# Patient Record
Sex: FEMALE | Race: ASIAN | Employment: UNEMPLOYED | URBAN - METROPOLITAN AREA
[De-identification: names, ages, dates, MRNs, and addresses within clinical notes are randomized per-mention and may not be internally consistent; named-entity substitution may affect disease eponyms.]

---

## 2017-03-20 ENCOUNTER — OFFICE VISIT (OUTPATIENT)
Dept: FAMILY MEDICINE CLINIC | Age: 82
End: 2017-03-20

## 2017-03-20 VITALS
SYSTOLIC BLOOD PRESSURE: 155 MMHG | TEMPERATURE: 98 F | DIASTOLIC BLOOD PRESSURE: 87 MMHG | OXYGEN SATURATION: 96 % | BODY MASS INDEX: 28.12 KG/M2 | RESPIRATION RATE: 16 BRPM | WEIGHT: 125 LBS | HEIGHT: 56 IN | HEART RATE: 79 BPM

## 2017-03-20 DIAGNOSIS — H91.90 HEARING LOSS, UNSPECIFIED LATERALITY: ICD-10-CM

## 2017-03-20 DIAGNOSIS — E78.5 HYPERLIPIDEMIA, UNSPECIFIED HYPERLIPIDEMIA TYPE: Primary | ICD-10-CM

## 2017-03-20 DIAGNOSIS — E55.9 VITAMIN D DEFICIENCY: ICD-10-CM

## 2017-03-20 DIAGNOSIS — J20.9 ACUTE BRONCHITIS, UNSPECIFIED: ICD-10-CM

## 2017-03-20 DIAGNOSIS — M81.0 OSTEOPOROSIS: ICD-10-CM

## 2017-03-20 DIAGNOSIS — E53.8 B12 DEFICIENCY: ICD-10-CM

## 2017-03-20 RX ORDER — BENZONATATE 100 MG/1
100 CAPSULE ORAL
Qty: 30 CAP | Refills: 0 | Status: SHIPPED | OUTPATIENT
Start: 2017-03-20 | End: 2017-09-20

## 2017-03-20 RX ORDER — DOXYCYCLINE 100 MG/1
100 CAPSULE ORAL 2 TIMES DAILY
Qty: 14 CAP | Refills: 0 | Status: SHIPPED | OUTPATIENT
Start: 2017-03-20 | End: 2017-03-27

## 2017-03-20 NOTE — PROGRESS NOTES
Juan Bruce is here today to follow up for chronic conditions. 1. Have you been to the ER, urgent care clinic since your last visit? Hospitalized since your last visit? No    2. Have you seen or consulted any other health care providers outside of the [facility] since your last visit? Include any pap smears or colon screening.  No

## 2017-03-20 NOTE — PROGRESS NOTES
Chief Complaint   Patient presents with    Hypertension     Follow up    Cholesterol Problem    Anemia       Pt is a 80y.o. year old female who presents for follow up of her chronic medical problems  Last seen here in 2011    BP Readings from Last 3 Encounters:   03/20/17 155/87   12/02/11 144/68   11/03/11 128/59   Repeat BP still slightly elevated    Fasting today  Stopped her statins a while back but does not recall any side effects    Cough for the last 5 days  No OTC meds  No fever, wheezing  Getting ready to travel to [place] to visit her sister who is in hospice    Not on any meds except for OTC meds    Drinks beer every so often  Non smoker since 2009      Has hearing loss per relative who is with her today    ROS:    Pt denies: Wt loss, Fever/Chills, HA, Visual changes, Fatigue, Chest pain, SOB, WORKMAN, Abd pain, N/V/D/C, Blood in stool or urine, Edema. Pertinent positive as above in HPI. All others were negative    Patient Active Problem List   Diagnosis Code    Spina bifida 26    Hyperlipidemia E78.5    Anemia D64.9    Osteoporosis M81.0    B12 deficiency E53.8       Past Medical History:   Diagnosis Date    Arthritis     Chronic pain     GERD (gastroesophageal reflux disease)        Current Outpatient Prescriptions   Medication Sig Dispense Refill    calcium 500 mg Tab Take  by mouth. History   Smoking Status    Passive Smoke Exposure - Never Smoker    Packs/day: 0.00    Types: Cigarettes    Last attempt to quit: 11/3/2010   Smokeless Tobacco    Never Used       No Known Allergies    Patient Labs were reviewed: yes      Patient Past Records were reviewed:  yes        Objective:     Vitals:    03/20/17 1327   BP: 155/87   Pulse: 79   Resp: 16   Temp: 98 °F (36.7 °C)   TempSrc: Oral   SpO2: 96%   Weight: 125 lb (56.7 kg)   Height: 4' 8\" (1.422 m)     Body mass index is 28.02 kg/(m^2).     Exam:   Appearance: alert, well appearing,  oriented to person, place, and time, acyanotic, in no respiratory distress and well hydrated. Coughing  HEENT:  NC/AT, pink conj, anicteric sclerae, no cerumen impaction, nasal congestion, no sinus tenderness  Neck:  No cervical lymphadenopathy, no JVD, no thyromegaly, no carotid bruit  Heart:  RRR without M/R/G  Lungs:  Scattered rhonchi but no crackle,  rales, or wheezes with good air exchange   Abdomen:  Non-tender, pos bowel sounds, no hepatosplenomegaly  Ext:  No C/C/E    Skin: no rash  Neuro: no lateralizing signs, CNs II-XII intact  Back: no tenderness along the spine    Assessment/ Plan:   Sara Munson was seen today for hypertension, cholesterol problem and anemia. Diagnoses and all orders for this visit:    Hyperlipidemia, unspecified hyperlipidemia type-low cholesterol diet discussed; will restart statin based on lipid results  -     METABOLIC PANEL, COMPREHENSIVE; Future  -     LIPID PANEL; Future      B12 deficiency-take OTC B12 daily  -     VITAMIN B12; Future    Osteoporosis-advised weight bearing exercises  -     DEXA BONE DENSITY STUDY AXIAL; Future    Hearing loss, unspecified laterality  -     REFERRAL TO ENT-OTOLARYNGOLOGY for hearing test    Elevated BP-low sodium diet advised  -     CBC WITH AUTOMATED DIFF; Future  -     METABOLIC PANEL, COMPREHENSIVE; Future    Vitamin D deficiency-advised sun exposure, OTC Ca+d daily  -     VITAMIN D, 25 HYDROXY; Future      Acute bronchitis, unspecified  -     doxycycline (VIBRAMYCIN) 100 mg capsule; Take 1 Cap by mouth two (2) times a day for 7 days. -     benzonatate (TESSALON) 100 mg capsule; Take 1 Cap by mouth three (3) times daily as needed for Cough. Follow-up Disposition:  Return in about 3 months (around 6/20/2017) for follow up. I have discussed the diagnosis with the patient and the intended plan as seen in the above orders. The patient has received an After-Visit Summary and questions were answered concerning future plans.      Medication Side Effects and Warnings were discussed with patient: yes    Patient verbalized understanding of above instructions.     Jeanette Mart MD  Internal Medicine  [facility]

## 2017-03-20 NOTE — PATIENT INSTRUCTIONS
High Cholesterol: Care Instructions  Your Care Instructions  Cholesterol is a type of fat in your blood. It is needed for many body functions, such as making new cells. Cholesterol is made by your body. It also comes from food you eat. High cholesterol means that you have too much of the fat in your blood. This raises your risk of a heart attack and stroke. LDL and HDL are part of your total cholesterol. LDL is the \"bad\" cholesterol. High LDL can raise your risk for heart disease, heart attack, and stroke. HDL is the \"good\" cholesterol. It helps clear bad cholesterol from the body. High HDL is linked with a lower risk of heart disease, heart attack, and stroke. Your cholesterol levels help your doctor find out your risk for having a heart attack or stroke. You and your doctor can talk about whether you need to lower your risk and what treatment is best for you. A heart-healthy lifestyle along with medicines can help lower your cholesterol and your risk. The way you choose to lower your risk will depend on how high your risk is for heart attack and stroke. It will also depend on how you feel about taking medicines. Follow-up care is a key part of your treatment and safety. Be sure to make and go to all appointments, and call your doctor if you are having problems. It's also a good idea to know your test results and keep a list of the medicines you take. How can you care for yourself at home? · Eat a variety of foods every day. Good choices include fruits, vegetables, whole grains (like oatmeal), dried beans and peas, nuts and seeds, soy products (like tofu), and fat-free or low-fat dairy products. · Replace butter, margarine, and hydrogenated or partially hydrogenated oils with olive and canola oils. (Canola oil margarine without trans fat is fine.)  · Replace red meat with fish, poultry, and soy protein (like tofu). · Limit processed and packaged foods like chips, crackers, and cookies.   · Bake, broil, or steam foods. Don't arcos them. · Be physically active. Get at least 30 minutes of exercise on most days of the week. Walking is a good choice. You also may want to do other activities, such as running, swimming, cycling, or playing tennis or team sports. · Stay at a healthy weight or lose weight by making the changes in eating and physical activity listed above. Losing just a small amount of weight, even 5 to 10 pounds, can reduce your risk for having a heart attack or stroke. · Do not smoke. When should you call for help? Watch closely for changes in your health, and be sure to contact your doctor if:  · You need help making lifestyle changes. · You have questions about your medicine. Where can you learn more? Go to http://tiffani-emre.info/. Enter C481 in the search box to learn more about \"High Cholesterol: Care Instructions. \"  Current as of: January 27, 2016  Content Version: 11.1  © 9707-0472 [employer]. Care instructions adapted under license by [employer] (which disclaims liability or warranty for this information). If you have questions about a medical condition or this instruction, always ask your healthcare professional. [address] any warranty or liability for your use of this information. DASH Diet: Care Instructions  Your Care Instructions  The DASH diet is an eating plan that can help lower your blood pressure. DASH stands for Dietary Approaches to Stop Hypertension. Hypertension is high blood pressure. The DASH diet focuses on eating foods that are high in calcium, potassium, and magnesium. These nutrients can lower blood pressure. The foods that are highest in these nutrients are fruits, vegetables, low-fat dairy products, nuts, seeds, and legumes. But taking calcium, potassium, and magnesium supplements instead of eating foods that are high in those nutrients does not have the same effect.  The DASH diet also includes whole grains, fish, and poultry. The DASH diet is one of several lifestyle changes your doctor may recommend to lower your high blood pressure. Your doctor may also want you to decrease the amount of sodium in your diet. Lowering sodium while following the DASH diet can lower blood pressure even further than just the DASH diet alone. Follow-up care is a key part of your treatment and safety. Be sure to make and go to all appointments, and call your doctor if you are having problems. It's also a good idea to know your test results and keep a list of the medicines you take. How can you care for yourself at home? Following the DASH diet  · Eat 4 to 5 servings of fruit each day. A serving is 1 medium-sized piece of fruit, ½ cup chopped or canned fruit, 1/4 cup dried fruit, or 4 ounces (½ cup) of fruit juice. Choose fruit more often than fruit juice. · Eat 4 to 5 servings of vegetables each day. A serving is 1 cup of lettuce or raw leafy vegetables, ½ cup of chopped or cooked vegetables, or 4 ounces (½ cup) of vegetable juice. Choose vegetables more often than vegetable juice. · Get 2 to 3 servings of low-fat and fat-free dairy each day. A serving is 8 ounces of milk, 1 cup of yogurt, or 1 ½ ounces of cheese. · Eat 6 to 8 servings of grains each day. A serving is 1 slice of bread, 1 ounce of dry cereal, or ½ cup of cooked rice, pasta, or cooked cereal. Try to choose whole-grain products as much as possible. · Limit lean meat, poultry, and fish to 2 servings each day. A serving is 3 ounces, about the size of a deck of cards. · Eat 4 to 5 servings of nuts, seeds, and legumes (cooked dried beans, lentils, and split peas) each week. A serving is 1/3 cup of nuts, 2 tablespoons of seeds, or ½ cup of cooked beans or peas. · Limit fats and oils to 2 to 3 servings each day. A serving is 1 teaspoon of vegetable oil or 2 tablespoons of salad dressing. · Limit sweets and added sugars to 5 servings or less a week. A serving is 1 tablespoon jelly or jam, ½ cup sorbet, or 1 cup of lemonade. · Eat less than 2,300 milligrams (mg) of sodium a day. If you limit your sodium to 1,500 mg a day, you can lower your blood pressure even more. Tips for success  · Start small. Do not try to make dramatic changes to your diet all at once. You might feel that you are missing out on your favorite foods and then be more likely to not follow the plan. Make small changes, and stick with them. Once those changes become habit, add a few more changes. · Try some of the following:  ¨ Make it a goal to eat a fruit or vegetable at every meal and at snacks. This will make it easy to get the recommended amount of fruits and vegetables each day. ¨ Try yogurt topped with fruit and nuts for a snack or healthy dessert. ¨ Add lettuce, tomato, cucumber, and onion to sandwiches. ¨ Combine a ready-made pizza crust with low-fat mozzarella cheese and lots of vegetable toppings. Try using tomatoes, squash, spinach, broccoli, carrots, cauliflower, and onions. ¨ Have a variety of cut-up vegetables with a low-fat dip as an appetizer instead of chips and dip. ¨ Sprinkle sunflower seeds or chopped almonds over salads. Or try adding chopped walnuts or almonds to cooked vegetables. ¨ Try some vegetarian meals using beans and peas. Add garbanzo or kidney beans to salads. Make burritos and tacos with mashed perez beans or black beans. Where can you learn more? Go to http://tiffani-emre.info/. Enter T897 in the search box to learn more about \"DASH Diet: Care Instructions. \"  Current as of: March 23, 2016  Content Version: 11.1  © 0198-3309 [employer]. Care instructions adapted under license by [employer] (which disclaims liability or warranty for this information).  If you have questions about a medical condition or this instruction, always ask your healthcare professional. Digna Schumacher disclaims any warranty or liability for your use of this information.

## 2017-03-20 NOTE — MR AVS SNAPSHOT
Visit Information Date & Time Provider Department Dept. Phone Encounter #  
 3/20/2017  1:00 PM Johanna [facility] [id number] Follow-up Instructions Return in about 3 months (around 6/20/2017) for follow up. Upcoming Health Maintenance Date Due DTaP/Tdap/Td series (1 - Tdap) 8/16/1954 ZOSTER VACCINE AGE 60> 8/16/1993 GLAUCOMA SCREENING Q2Y 8/16/1998 MEDICARE YEARLY EXAM 8/16/1998 INFLUENZA AGE 9 TO ADULT 8/1/2016 Pneumococcal 65+ Low/Medium Risk (2 of 2 - PPSV23) 11/3/2016 Allergies as of 3/20/2017  Review Complete On: 3/20/2017 By: Johanna Francois MD  
 No Known Allergies Current Immunizations  Reviewed on 11/3/2011 Name Date Pneumococcal Vaccine (Unspecified Type) 11/3/2011 Not reviewed this visit You Were Diagnosed With   
  
 Codes Comments Hyperlipidemia, unspecified hyperlipidemia type    -  Primary ICD-10-CM: E78.5 ICD-9-CM: 272.4 B12 deficiency     ICD-10-CM: E53.8 ICD-9-CM: 266.2 Osteoporosis     ICD-10-CM: M81.0 ICD-9-CM: 733.00 Hearing loss, unspecified laterality     ICD-10-CM: H91.90 ICD-9-CM: 389.9 Elevated BP     ICD-10-CM: I10 
ICD-9-CM: 401.9 Vitamin D deficiency     ICD-10-CM: E55.9 ICD-9-CM: 268.9 Acute bronchitis, unspecified     ICD-10-CM: J20.9 ICD-9-CM: 466.0 Vitals BP Pulse Temp Resp Height(growth percentile) Weight(growth percentile) 155/87 79 98 °F (36.7 °C) (Oral) 16 4' 8\" (1.422 m) 125 lb (56.7 kg) SpO2 BMI OB Status Smoking Status 96% 28.02 kg/m2 Postmenopausal Passive Smoke Exposure - Never Smoker Vitals History BMI and BSA Data Body Mass Index Body Surface Area 28.02 kg/m 2 1.5 m 2 Preferred Pharmacy [facility]acy Name Phone [id number] [phone] [facility] [phone] Your Updated Medication List  
  
   
 This list is accurate as of: 3/20/17  1:57 PM.  Always use your most recent med list.  
  
  
  
  
 benzonatate 100 mg capsule Commonly known as:  TESSALON Take 1 Cap by mouth three (3) times daily as needed for Cough. calcium 500 mg Tab Take  by mouth. doxycycline 100 mg capsule Commonly known as:  VIBRAMYCIN Take 1 Cap by mouth two (2) times a day for 7 days. Prescriptions Sent to Pharmacy Refills  
 doxycycline (VIBRAMYCIN) 100 mg capsule 0 Sig: Take 1 Cap by mouth two (2) times a day for 7 days. Class: Normal  
 Pharm[facility]: [id number] [place] [address] [address] Ph #: [phone] Route: Oral  
 benzonatate (TESSALON) 100 mg capsule 0 Sig: Take 1 Cap by mouth three (3) times daily as needed for Cough. Class: Normal  
 [id number] [address] Ph #: [phone] Route: Oral  
  
We Performed the Following CBC WITH AUTOMATED DIFF [57075 CPT(R)] DEXA BONE DENSITY STUDY AXIAL [80243 CPT(R)] LIPID PANEL [07054 CPT(R)] METABOLIC PANEL, COMPREHENSIVE [65060 CPT(R)] REFERRAL TO ENT-OTOLARYNGOLOGY [CRW65 Custom] Comments:  
 Please evaluate patient for hearing loss VITAMIN B12 [id number] CPT(R)] VITAMIN D, 25 HYDROXY [id number] CPT(R)] Follow-up Instructions Return in about 3 months (around 6/20/2017) for follow up. To-Do List   
 03/20/2017 Lab:  CBC WITH AUTOMATED DIFF   
  
 03/20/2017 Lab:  LIPID PANEL   
  
 03/20/2017 Lab:  METABOLIC PANEL, COMPREHENSIVE   
  
 03/20/2017 Lab:  VITAMIN B12   
  
 03/20/2017 Lab:  VITAMIN D, 25 HYDROXY   
  
 04/19/2017 Imaging:  DEXA BONE DENSITY STUDY AXIAL Referral Information Referral ID Referred By Referred To  
  
 [id number] Michael Langley Not Available Visits Status Start Date End Date 1 New Request 3/20/17 3/20/18 If your referral has a status of pending review or denied, additional information will be sent to support the outcome of this decision. Patient Instructions High Cholesterol: Care Instructions Your Care Instructions Cholesterol is a type of fat in your blood. It is needed for many body functions, such as making new cells. Cholesterol is made by your body. It also comes from food you eat. High cholesterol means that you have too much of the fat in your blood. This raises your risk of a heart attack and stroke. LDL and HDL are part of your total cholesterol. LDL is the \"bad\" cholesterol. High LDL can raise your risk for heart disease, heart attack, and stroke. HDL is the \"good\" cholesterol. It helps clear bad cholesterol from the body. High HDL is linked with a lower risk of heart disease, heart attack, and stroke. Your cholesterol levels help your doctor find out your risk for having a heart attack or stroke. You and your doctor can talk about whether you need to lower your risk and what treatment is best for you. A heart-healthy lifestyle along with medicines can help lower your cholesterol and your risk. The way you choose to lower your risk will depend on how high your risk is for heart attack and stroke. It will also depend on how you feel about taking medicines. Follow-up care is a key part of your treatment and safety. Be sure to make and go to all appointments, and call your doctor if you are having problems. It's also a good idea to know your test results and keep a list of the medicines you take. How can you care for yourself at home? · Eat a variety of foods every day. Good choices include fruits, vegetables, whole grains (like oatmeal), dried beans and peas, nuts and seeds, soy products (like tofu), and fat-free or low-fat dairy products. · Replace butter, margarine, and hydrogenated or partially hydrogenated oils with olive and canola oils.  (Canola oil margarine without trans fat is fine.) · Replace red meat with fish, poultry, and soy protein (like tofu). · Limit processed and packaged foods like chips, crackers, and cookies. · Bake, broil, or steam foods. Don't arcos them. · Be physically active. Get at least 30 minutes of exercise on most days of the week. Walking is a good choice. You also may want to do other activities, such as running, swimming, cycling, or playing tennis or team sports. · Stay at a healthy weight or lose weight by making the changes in eating and physical activity listed above. Losing just a small amount of weight, even 5 to 10 pounds, can reduce your risk for having a heart attack or stroke. · Do not smoke. When should you call for help? Watch closely for changes in your health, and be sure to contact your doctor if: 
· You need help making lifestyle changes. · You have questions about your medicine. Where can you learn more? Go to http://tiffaniOrchestrateemre.info/. Enter Q811 in the search box to learn more about \"High Cholesterol: Care Instructions. \" Current as of: January 27, 2016 Content Version: 11.1 © 8017-1310 Lapio. Care instructions adapted under license by Zoom (which disclaims liability or warranty for this information). If you have questions about a medical condition or this instruction, always ask your healthcare professional. [address] any warranty or liability for your use of this information. DASH Diet: Care Instructions Your Care Instructions The DASH diet is an eating plan that can help lower your blood pressure. DASH stands for Dietary Approaches to Stop Hypertension. Hypertension is high blood pressure. The DASH diet focuses on eating foods that are high in calcium, potassium, and magnesium. These nutrients can lower blood pressure.  The foods that are highest in these nutrients are fruits, vegetables, low-fat dairy products, nuts, seeds, and legumes. But taking calcium, potassium, and magnesium supplements instead of eating foods that are high in those nutrients does not have the same effect. The DASH diet also includes whole grains, fish, and poultry. The DASH diet is one of several lifestyle changes your doctor may recommend to lower your high blood pressure. Your doctor may also want you to decrease the amount of sodium in your diet. Lowering sodium while following the DASH diet can lower blood pressure even further than just the DASH diet alone. Follow-up care is a key part of your treatment and safety. Be sure to make and go to all appointments, and call your doctor if you are having problems. It's also a good idea to know your test results and keep a list of the medicines you take. How can you care for yourself at home? Following the DASH diet · Eat 4 to 5 servings of fruit each day. A serving is 1 medium-sized piece of fruit, ½ cup chopped or canned fruit, 1/4 cup dried fruit, or 4 ounces (½ cup) of fruit juice. Choose fruit more often than fruit juice. · Eat 4 to 5 servings of vegetables each day. A serving is 1 cup of lettuce or raw leafy vegetables, ½ cup of chopped or cooked vegetables, or 4 ounces (½ cup) of vegetable juice. Choose vegetables more often than vegetable juice. · Get 2 to 3 servings of low-fat and fat-free dairy each day. A serving is 8 ounces of milk, 1 cup of yogurt, or 1 ½ ounces of cheese. · Eat 6 to 8 servings of grains each day. A serving is 1 slice of bread, 1 ounce of dry cereal, or ½ cup of cooked rice, pasta, or cooked cereal. Try to choose whole-grain products as much as possible. · Limit lean meat, poultry, and fish to 2 servings each day. A serving is 3 ounces, about the size of a deck of cards. · Eat 4 to 5 servings of nuts, seeds, and legumes (cooked dried beans, lentils, and split peas) each week.  A serving is 1/3 cup of nuts, 2 tablespoons of seeds, or ½ cup of cooked beans or peas. · Limit fats and oils to 2 to 3 servings each day. A serving is 1 teaspoon of vegetable oil or 2 tablespoons of salad dressing. · Limit sweets and added sugars to 5 servings or less a week. A serving is 1 tablespoon jelly or jam, ½ cup sorbet, or 1 cup of lemonade. · Eat less than 2,300 milligrams (mg) of sodium a day. If you limit your sodium to 1,500 mg a day, you can lower your blood pressure even more. Tips for success · Start small. Do not try to make dramatic changes to your diet all at once. You might feel that you are missing out on your favorite foods and then be more likely to not follow the plan. Make small changes, and stick with them. Once those changes become habit, add a few more changes. · Try some of the following: ¨ Make it a goal to eat a fruit or vegetable at every meal and at snacks. This will make it easy to get the recommended amount of fruits and vegetables each day. ¨ Try yogurt topped with fruit and nuts for a snack or healthy dessert. ¨ Add lettuce, tomato, cucumber, and onion to sandwiches. ¨ Combine a ready-made pizza crust with low-fat mozzarella cheese and lots of vegetable toppings. Try using tomatoes, squash, spinach, broccoli, carrots, cauliflower, and onions. ¨ Have a variety of cut-up vegetables with a low-fat dip as an appetizer instead of chips and dip. ¨ Sprinkle sunflower seeds or chopped almonds over salads. Or try adding chopped walnuts or almonds to cooked vegetables. ¨ Try some vegetarian meals using beans and peas. Add garbanzo or kidney beans to salads. Make burritos and tacos with mashed perez beans or black beans. Where can you learn more? Go to http://tiffani-emre.info/. Enter W372 in the search box to learn more about \"DASH Diet: Care Instructions. \" Current as of: March 23, 2016 Content Version: 11.1 © 9872-5915 SirenServ, Incorporated.  Care instructions adapted under license by [address] (which disclaims liability or warranty for this information). If you have questions about a medical condition or this instruction, always ask your healthcare professional. [address] any warranty or liability for your use of this information. Introducing [facility]! Dear Luwanna Aschoff: Thank you for requesting a Fresco [employer] account. Our records indicate that you already have an active Fres[employer] account. You can access your account anytime at [email or web address] Did you know that you can access your hospital and ER discharge instructions at any time in Fresco Logic? You can also review all of your test results from your hospital stay or ER visit. Additional Information If you have questions, please visit the Frequently Asked Questions section of the Fresco Logic website at [email or web address] Remember, [employer] is NOT to be used for urgent needs. For medical emergencies, dial 911. Now available from your iPhone and Android! Please provide this summary of care documentation to your next provider. Your primary care clinician is listed as Melba Schwarz. If you have any questions after today's visit, please call [phone].

## 2017-06-20 ENCOUNTER — OFFICE VISIT (OUTPATIENT)
Dept: FAMILY MEDICINE CLINIC | Age: 82
End: 2017-06-20

## 2017-06-20 VITALS
SYSTOLIC BLOOD PRESSURE: 158 MMHG | HEIGHT: 56 IN | BODY MASS INDEX: 27.44 KG/M2 | WEIGHT: 122 LBS | RESPIRATION RATE: 17 BRPM | OXYGEN SATURATION: 95 % | HEART RATE: 83 BPM | DIASTOLIC BLOOD PRESSURE: 80 MMHG | TEMPERATURE: 96.9 F

## 2017-06-20 DIAGNOSIS — R05.3 CHRONIC COUGH: ICD-10-CM

## 2017-06-20 DIAGNOSIS — M72.2 PLANTAR FASCIITIS OF LEFT FOOT: ICD-10-CM

## 2017-06-20 DIAGNOSIS — M81.0 OSTEOPOROSIS WITHOUT CURRENT PATHOLOGICAL FRACTURE, UNSPECIFIED OSTEOPOROSIS TYPE: ICD-10-CM

## 2017-06-20 DIAGNOSIS — I10 ESSENTIAL HYPERTENSION: Primary | ICD-10-CM

## 2017-06-20 DIAGNOSIS — H91.93 BILATERAL HEARING LOSS, UNSPECIFIED HEARING LOSS TYPE: ICD-10-CM

## 2017-06-20 DIAGNOSIS — E78.5 HYPERLIPIDEMIA, UNSPECIFIED HYPERLIPIDEMIA TYPE: ICD-10-CM

## 2017-06-20 RX ORDER — AMLODIPINE BESYLATE 5 MG/1
5 TABLET ORAL DAILY
Qty: 30 TAB | Refills: 6 | Status: SHIPPED | OUTPATIENT
Start: 2017-06-20 | End: 2017-09-20

## 2017-06-20 NOTE — PROGRESS NOTES
HISTORY OF PRESENT ILLNESS  Viktoria Azul is a 80 y.o. female. HPI    ROS    Physical Exam    ASSESSMENT and PLAN  {ASSESSMENT/PLAN:74537}     1. Have you been to the ER, urgent care clinic since your last visit? Hospitalized since your last visit? {Yes when where and reason for visit:20441} no    2. Have you seen or consulted any other health care providers outside of the [address] [place] [address] since your last visit? Include any pap smears or colon screening. {Yes when where and reason for visit:20441} no    Pt here today for 3 month follow-up. Pt is having left foot and heel pain worse when she is walking. Pt is due for an dtap and a glaucoma screening.

## 2017-06-20 NOTE — MR AVS SNAPSHOT
Visit Information Date & Time Provider Department Dept. Phone Encounter #  
 6/20/2017 11:00 AM Lexi Maldonado MD [facility] [id number] Follow-up Instructions Return in about 3 months (around 9/20/2017) for follow up. Upcoming Health Maintenance Date Due DTaP/Tdap/Td series (1 - Tdap) 8[birth year]16/1954 ZOSTER VACCINE AGE 60> 8/16/1993 GLAUCOMA SCREENING Q2Y 8/16/1998 MEDICARE YEARLY EXAM 8/16/1998 Pneumococcal 65+ Low/Medium Risk (2 of 2 - PPSV23) 11/3/2016 INFLUENZA AGE 9 TO ADULT 8/1/2017 Allergies as of 6/20/2017  Review Complete On: 6/20/2017 By: Lexi Maldonado MD  
 No Known Allergies Current Immunizations  Reviewed on 11/3/2011 Name Date Pneumococcal Vaccine (Unspecified Type) 11/3/2011 Not reviewed this visit You Were Diagnosed With   
  
 Codes Comments Essential hypertension    -  Primary ICD-10-CM: I10 
ICD-9-CM: 401.9 Hyperlipidemia, unspecified hyperlipidemia type     ICD-10-CM: E78.5 ICD-9-CM: 272.4 Osteoporosis without current pathological fracture, unspecified osteoporosis type     ICD-10-CM: M81.0 ICD-9-CM: 733.00 Bilateral hearing loss, unspecified hearing loss type     ICD-10-CM: H91.93 
ICD-9-CM: 389. 9 Chronic cough     ICD-10-CM: R05 ICD-9-CM: 786.2 Plantar fasciitis of left foot     ICD-10-CM: M72.2 ICD-9-CM: 728.71 Vitals BP Pulse Temp Resp Height(growth percentile) Weight(growth percentile) 158/80 (BP 1 Location: Left arm, BP Patient Position: Sitting) 83 96.9 °F (36.1 °C) (Oral) 17 4' 8\" (1.422 m) 122 lb (55.3 kg) SpO2 BMI OB Status Smoking Status 95% 27.35 kg/m2 Postmenopausal Passive Smoke Exposure - Never Smoker BMI and BSA Data Body Mass Index Body Surface Area  
 27.35 kg/m 2 1.48 m 2 Preferred Pharmacy [facility] Phone [id number] [phone] [facility] [phone] Your Updated Medication List  
  
   
This list is accurate as of: 6/20/17 12:09 PM.  Always use your most recent med list. amLODIPine 5 mg tablet Commonly known as:  Barabara Puls Take 1 Tab by mouth daily. benzonatate 100 mg capsule Commonly known as:  TESSALON Take 1 Cap by mouth three (3) times daily as needed for Cough. calcium 500 mg Tab Take  by mouth. Prescriptions Sent to Pharmacy Refills  
 amLODIPine (NORVASC) 5 mg tablet 6 Sig: Take 1 Tab by mouth daily. Class: Normal  
 [id number] [phone] Ph #: [phone] Route: Oral  
  
We Performed the Following AMB SUPPLY ORDER [[id number] Custom] Comments:  
 1 and 1/2 inch gel heel insets bilateral  
  
Follow-up Instructions Return in about 3 months (around 9/20/2017) for follow up. To-Do List   
 06/20/2017 Imaging:  XR CHEST PA LAT   
  
 06/20/2017 Imaging:  XR FOOT LT MIN 3 V   
  
 06/20/2017 12:10 PM  
  Appointment with [facility] RAD XR RM 1 at [address] [place] [address] ([phone])  
  
 06/20/2017 12:15 PM  
  Appointment with [facility] RAD XR RM 1 at [address] [place] [address] ([phone]) Patient Instructions Plantar Fasciitis: Exercises Your Care Instructions Here are some examples of typical rehabilitation exercises for your condition. Start each exercise slowly. Ease off the exercise if you start to have pain. Your doctor or physical therapist will tell you when you can start these exercises and which ones will work best for you. How to do the exercises Note: Each exercise should create a pulling feeling but should not cause pain. Towel stretch 1. Sit with your legs extended and knees straight. 2. Place a towel around your foot just under the toes. 3. Hold each end of the towel in each hand, with your hands above your knees. 4. Pull back with the towel so that your foot stretches toward you. 5. Hold the position for at least 15 to 30 seconds. 6. Repeat 2 to 4 times a session, up to 5 sessions a day. Calf stretch Note: This exercise stretches the muscles at the back of the lower leg (the calf) and the Achilles tendon. Do this exercise 3 or 4 times a day, 5 days a week. 1. Stand facing a wall with your hands on the wall at about eye level. Put the leg you want to stretch about a step behind your other leg. 2. Keeping your back heel on the floor, bend your front knee until you feel a stretch in the back leg. 3. Hold the stretch for 15 to 30 seconds. Repeat 2 to 4 times. Plantar fascia and calf stretch Note: Stretching the plantar fascia and calf muscles can increase flexibility and decrease heel pain. You can do this exercise several times each day and before and after activity. 1. Stand on a step as shown above. Be sure to hold on to the banister. 2. Slowly let your heels down over the edge of the step as you relax your calf muscles. You should feel a gentle stretch across the bottom of your foot and up the back of your leg to your knee. 3. Hold the stretch about 15 to 30 seconds, and then tighten your calf muscle a little to bring your heel back up to the level of the step. Repeat 2 to 4 times. Towel curls 1. While sitting, place your foot on a towel on the floor and scrunch the towel toward you with your toes. 2. Then, also using your toes, push the towel away from you. Note: Make this exercise more challenging by placing a weighted object, such as a soup can, on the other end of the towel. [place] pickups 1. Put marbles on the floor next to a cup. 
2. Using your toes, try to lift the marbles up from the floor and put them in the cup. Follow-up care is a key part of your treatment and safety. Be sure to make and go to all appointments, and call your doctor if you are having problems. It's also a good idea to know your test results and keep a list of the medicines you take. Where can you learn more? Go to http://tiffani-emre.info/. Deejay Abdullahi in the search box to learn more about \"Plantar Fasciitis: Exercises. \" Current as of: March 21, 2017 Content Version: 11.3 © 9584-2734 NextGen Platform, Incorporated. Care instructions adapted under license by mydeco (which disclaims liability or warranty for this information). If you have questions about a medical condition or this instruction, always ask your healthcare professional. [address] any warranty or liability for your use of this information. Introducing [facility]! Dear Ivan Bustamante: Thank you for requesting a Forseva account. Our records indicate that you already have an active Forseva account. You can access your account anytime at [email or web address] Did you know that you can access your hospital and ER discharge instructions at any time in Forseva? You can also review all of your test results from your hospital stay or ER visit. Additional Information If you have questions, please visit the Frequently Asked Questions section of the [employer]seva website at [email or web address] Remember, Forseva is NOT to be used for urgent needs. For medical emergencies, dial 911. Now available from your iPhone and Android! Please provide this summary of care documentation to your next provider. Your primary care clinician is listed as Chan Vazquez. If you have any questions after today's visit, please call [phone].

## 2017-06-20 NOTE — Clinical Note
Merly-can we schedule her for ENt appt/this is Breanna's grandmother Swetha Wilson you schedule her for the Dexa Thanks to both of you!

## 2017-06-20 NOTE — PATIENT INSTRUCTIONS
Plantar Fasciitis: Exercises  Your Care Instructions  Here are some examples of typical rehabilitation exercises for your condition. Start each exercise slowly. Ease off the exercise if you start to have pain. Your doctor or physical therapist will tell you when you can start these exercises and which ones will work best for you. How to do the exercises  Note: Each exercise should create a pulling feeling but should not cause pain. Towel stretch    1. Sit with your legs extended and knees straight. 2. Place a towel around your foot just under the toes. 3. Hold each end of the towel in each hand, with your hands above your knees. 4. Pull back with the towel so that your foot stretches toward you. 5. Hold the position for at least 15 to 30 seconds. 6. Repeat 2 to 4 times a session, up to 5 sessions a day. Calf stretch    Note: This exercise stretches the muscles at the back of the lower leg (the calf) and the Achilles tendon. Do this exercise 3 or 4 times a day, 5 days a week. 1. Stand facing a wall with your hands on the wall at about eye level. Put the leg you want to stretch about a step behind your other leg. 2. Keeping your back heel on the floor, bend your front knee until you feel a stretch in the back leg. 3. Hold the stretch for 15 to 30 seconds. Repeat 2 to 4 times. Plantar fascia and calf stretch    Note: Stretching the plantar fascia and calf muscles can increase flexibility and decrease heel pain. You can do this exercise several times each day and before and after activity. 1. Stand on a step as shown above. Be sure to hold on to the banister. 2. Slowly let your heels down over the edge of the step as you relax your calf muscles. You should feel a gentle stretch across the bottom of your foot and up the back of your leg to your knee. 3. Hold the stretch about 15 to 30 seconds, and then tighten your calf muscle a little to bring your heel back up to the level of the step.  Repeat 2 to 4 times. Towel curls    1. While sitting, place your foot on a towel on the floor and scrunch the towel toward you with your toes. 2. Then, also using your toes, push the towel away from you. Note: Make this exercise more challenging by placing a weighted object, such as a soup can, on the other end of the towel. [place] pickups    1. Put marbles on the floor next to a cup.  2. Using your toes, try to lift the marbles up from the floor and put them in the cup. Follow-up care is a key part of your treatment and safety. Be sure to make and go to all appointments, and call your doctor if you are having problems. It's also a good idea to know your test results and keep a list of the medicines you take. Where can you learn more? Go to http://tiffani-emre.info/. Angel Sanchez in the search box to learn more about \"Plantar Fasciitis: Exercises. \"  Current as of: March 21, 2017  Content Version: 11.3  © 7706-4343 MedSynergies, Incorporated. Care instructions adapted under license by DaisyBill (which disclaims liability or warranty for this information). If you have questions about a medical condition or this instruction, always ask your healthcare professional. [address] any warranty or liability for your use of this information.

## 2017-06-20 NOTE — PROGRESS NOTES
Chief Complaint   Patient presents with    Follow-up      3 month     Foot Pain      left foot pain x 3 weeks       Pt is a 80y.o. year old female who presents for follow up of her chronic medical problems    Has been walking a lot-now with a lot of left heel pain    Health Maintenance Due   Topic Date Due    DTaP/Tdap/Td series (1 - Tdap) 08/16/1954    ZOSTER VACCINE AGE 60>  08/16/1993    GLAUCOMA SCREENING Q2Y  08/16/1998    MEDICARE YEARLY EXAM  08/16/1998    Pneumococcal 65+ Low/Medium Risk (2 of 2 - PPSV23) 11/03/2016     BP still up  Does not check it at home    Also still with intermittent cough    Has not been to ENT for her hearing loss  Has not had the Dexa as previously ordered    ROS:    Pt denies: Wt loss, Fever/Chills, HA, Visual changes, Fatigue, Chest pain, SOB, WORKMAN, Abd pain, N/V/D/C, Blood in stool or urine, Edema. Pertinent positive as above in HPI. All others were negative    Patient Active Problem List   Diagnosis Code    Spina bifida 26    Hyperlipidemia E78.5    Anemia D64.9    Osteoporosis M81.0    Bilateral hearing loss H91.93       Past Medical History:   Diagnosis Date    Arthritis     Chronic pain     GERD (gastroesophageal reflux disease)        Current Outpatient Prescriptions   Medication Sig Dispense Refill    calcium 500 mg Tab Take  by mouth.  benzonatate (TESSALON) 100 mg capsule Take 1 Cap by mouth three (3) times daily as needed for Cough.  30 Cap 0       History   Smoking Status    Passive Smoke Exposure - Never Smoker    Packs/day: 0.00    Types: Cigarettes    Last attempt to quit: 11/3/2010   Smokeless Tobacco    Never Used       No Known Allergies    Patient Labs were reviewed: yes      Patient Past Records were reviewed:  yes        Objective:     Vitals:    06/20/17 1125   BP: 158/80   Pulse: 83   Resp: 17   Temp: 96.9 °F (36.1 °C)   TempSrc: Oral   SpO2: 95%   Weight: 122 lb (55.3 kg)   Height: 4' 8\" (1.422 m)     Body mass index is 27.35 kg/(m^2). Exam:   Appearance: alert, well appearing,  oriented to person, place, and time, acyanotic, in no respiratory distress and well hydrated. HEENT:  NC/AT, pink conj, anicteric sclerae, no cerumen impaction  Neck:  No cervical lymphadenopathy, no JVD, no thyromegaly, no carotid bruit  Heart:  RRR without M/R/G  Lungs:  CTAB, no rhonchi, rales, or wheezes with good air exchange   Abdomen:  Non-tender, pos bowel sounds, no hepatosplenomegaly  Ext:  No C/C/E, reproducible pain on both heels    Skin: no rash  Neuro: no lateralizing signs, CNs II-XII intact      Assessment/ Plan:   Viktoria was seen today for follow-up and foot pain. Diagnoses and all orders for this visit:    Essential hypertension-still elevated, will start Norvasc  -     amLODIPine (NORVASC) 5 mg tablet; Take 1 Tab by mouth daily. Hyperlipidemia, unspecified hyperlipidemia type-low cholesterol diet advised, recheck in 6 months    Osteoporosis without current pathological fracture, unspecified osteoporosis type-advised to schedule appt for Dexa/took Fosamax sporadically in the past    Bilateral hearing loss, unspecified hearing loss type-will follow up on ENt appt    Chronic cough/former smoker  -     XR CHEST PA LAT; Future    Plantar fasciitis of left foot-advised on stretching exercises and to get gel heel inserts  -     XR FOOT LT MIN 3 V; Future  -     AMB SUPPLY ORDER for the heel inserts        Follow-up Disposition:  Return in about 3 months (around 9/20/2017) for follow up. I have discussed the diagnosis with the patient and the intended plan as seen in the above orders. The patient has received an After-Visit Summary and questions were answered concerning future plans. Medication Side Effects and Warnings were discussed with patient: yes    Patient verbalized understanding of above instructions.     Petra Closs, MD  Internal Medicine  [facility]

## 2017-08-04 ENCOUNTER — OFFICE VISIT (OUTPATIENT)
Dept: FAMILY MEDICINE CLINIC | Age: 82
End: 2017-08-04

## 2017-08-04 VITALS
RESPIRATION RATE: 17 BRPM | BODY MASS INDEX: 27.67 KG/M2 | SYSTOLIC BLOOD PRESSURE: 149 MMHG | WEIGHT: 123 LBS | OXYGEN SATURATION: 100 % | TEMPERATURE: 98.1 F | HEART RATE: 83 BPM | HEIGHT: 56 IN | DIASTOLIC BLOOD PRESSURE: 83 MMHG

## 2017-08-04 DIAGNOSIS — M79.89 BILATERAL SWELLING OF FEET: Primary | ICD-10-CM

## 2017-08-04 NOTE — PROGRESS NOTES
Chief Complaint   Patient presents with    Swelling     Jimenez feet swollen and painful worse first thing in the morning     HPI:    This is an 79 y/o female patient of Dr David Angela who presents for the above symptom. Bilateral feet swelling especially in the morning. No leg pain, tingling or numbness. Patient's daughter thinks this may bee due to Amlodipine- plan to hold med until she follow up with her PCP. Patient denies SOB, CP, dizziness, headache. ROS: pertinent positives as noted in HPI. All others were negative        Past Medical History:   Diagnosis Date    Arthritis     Chronic pain     GERD (gastroesophageal reflux disease)      Social History     Social History    Marital status:      Spouse name: N/A    Number of children: N/A    Years of education: N/A     Occupational History    Not on file. Social History Main Topics    Smoking status: Passive Smoke Exposure - Never Smoker     Packs/day: 0.00     Types: Cigarettes     Last attempt to quit: 11/3/2010    Smokeless tobacco: Never Used    Alcohol use Yes      Comment: beer & wine    Drug use: No    Sexual activity: No     Other Topics Concern    Not on file     Social History Narrative     Current Outpatient Prescriptions   Medication Sig Dispense Refill    amLODIPine (NORVASC) 5 mg tablet Take 1 Tab by mouth daily. 30 Tab 6    calcium 500 mg Tab Take  by mouth.  benzonatate (TESSALON) 100 mg capsule Take 1 Cap by mouth three (3) times daily as needed for Cough.  30 Cap 0     No Known Allergies      Physical Exam:    Vital Signs:   Visit Vitals    /83    Pulse 83    Temp 98.1 °F (36.7 °C) (Oral)    Resp 17    Ht 4' 8\" (1.422 m)    Wt 123 lb (55.8 kg)    SpO2 100%    BMI 27.58 kg/m2         General: a, a & o x 3, afebrile, interacting appropriately, in no acute distress  Respiratory: lung sounds clear bilaterally,  no wheezes or crackles  Cardiovascular: normal S1S2, regular rate and rhythm, no murmurs  Extremities: 1+ non pitting bilateral ankle edema. Assessment/Plan:      ICD-10-CM ICD-9-CM    1. Bilateral swelling of feet M79.89 729.81 Pt advised to elevated feet    Low salt diet    She was advised to check BP at home and call office if consistently greater than 150/90    Patient advised to hold Amlodipine until she follow up with her PCP           Additional Notes: Discussed today's diagnosis, treatment plans. Discussed medication indications and side effects. After Visit Summary: Provided and discussed printed patient instructions. Answered questions in relation to today's diagnosis.   Follow-up Disposition: 2 weeks with Dr Mars Farnsworth, NP-BC  [address] [facility],[address]

## 2017-08-04 NOTE — MR AVS SNAPSHOT
Visit Information Date & Time Provider Department Dept. Phone Encounter #  
 8/4/2017 11:30 AM Carmen Marin NP [place] [address] [id number] Follow-up Instructions Return if symptoms worsen or fail to improve. Your Appointments 9/20/2017 10:30 AM  
Follow Up with Gurjit Fernandez MD  
[facility] ([facility]) Appt Note: f/u 3 months [place]. [address]  
  
   
 [address] [facility] Upcoming Health Maintenance Date Due DTaP/Tdap/Td series (1 - Tdap) 8/16/1954 ZOSTER VACCINE AGE 60> 6/16/1993 GLAUCOMA SCREENING Q2Y 8/16/1998 MEDICARE YEARLY EXAM 8/16/1998 Pneumococcal 65+ Low/Medium Risk (2 of 2 - PPSV23) 11/3/2016 INFLUENZA AGE 9 TO ADULT 8/1/2017 Allergies as of 8/4/2017  Review Complete On: 6/20/2017 By: Gurjit Fernandez MD  
 No Known Allergies Current Immunizations  Reviewed on 11/3/2011 Name Date ZZZ-RETIRED (DO NOT USE) Pneumococcal Vaccine (Unspecified Type) 11/3/2011 Not reviewed this visit You Were Diagnosed With   
  
 Codes Comments Bilateral swelling of feet    -  Primary ICD-10-CM: M79.89 ICD-9-CM: 729.81 Vitals BP Pulse Temp Resp Height(growth percentile) Weight(growth percentile) 149/83 83 98.1 °F (36.7 °C) (Oral) 17 4' 8\" (1.422 m) 123 lb (55.8 kg) SpO2 BMI OB Status Smoking Status 100% 27.58 kg/m2 Postmenopausal Passive Smoke Exposure - Never Smoker BMI and BSA Data Body Mass Index Body Surface Area  
 27.58 kg/m 2 1.48 m 2 Preferred Pharmacy Pharmacy Name Phone [id number] [phone] [place] [phone] Your Updated Medication List  
  
   
This list is accurate as of: 8/4/17 12:07 PM.  Always use your most recent med list. amLODIPine 5 mg tablet Commonly known as:  Antonio Lute Take 1 Tab by mouth daily. benzonatate 100 mg capsule Commonly known as:  TESSALON Take 1 Cap by mouth three (3) times daily as needed for Cough. calcium 500 mg Tab Take  by mouth. Follow-up Instructions Return if symptoms worsen or fail to improve. Patient Instructions Leg and Ankle Edema: Care Instructions Your Care Instructions Swelling in the legs, ankles, and feet is called edema. It is common after you sit or stand for a while. Long plane flights or car rides often cause swelling in the legs and feet. You may also have swelling if you have to stand for long periods of time at your job. Problems with the veins in the legs (varicose veins) and changes in hormones can also cause swelling. Sometimes the swelling in the ankles and feet is caused by a more serious problem, such as heart failure, infection, blood clots, or liver or kidney disease. Follow-up care is a key part of your treatment and safety. Be sure to make and go to all appointments, and call your doctor if you are having problems. Its also a good idea to know your test results and keep a list of the medicines you take. How can you care for yourself at home? · If your doctor gave you medicine, take it as prescribed. Call your doctor if you think you are having a problem with your medicine. · Whenever you are resting, raise your legs up. Try to keep the swollen area higher than the level of your heart. · Take breaks from standing or sitting in one position. ¨ Walk around to increase the blood flow in your lower legs. ¨ Move your feet and ankles often while you stand, or tighten and relax your leg muscles. · Wear support stockings. Put them on in the morning, before swelling gets worse. · Eat a balanced diet. Lose weight if you need to. · Limit the amount of salt (sodium) in your diet. Salt holds fluid in the body and may increase swelling. When should you call for help? Call 911 anytime you think you may need emergency care. For example, call if: 
· You have symptoms of a blood clot in your lung (called a pulmonary embolism). These may include: 
¨ Sudden chest pain. ¨ Trouble breathing. ¨ Coughing up blood. Call your doctor now or seek immediate medical care if: 
· You have signs of a blood clot, such as: 
¨ Pain in your calf, back of the knee, thigh, or groin. ¨ Redness and swelling in your leg or groin. · You have symptoms of infection, such as: 
¨ Increased pain, swelling, warmth, or redness. ¨ Red streaks or pus. ¨ A fever. Watch closely for changes in your health, and be sure to contact your doctor if: 
· Your swelling is getting worse. · You have new or worsening pain in your legs. · You do not get better as expected. Where can you learn more? Go to http://tiffani-emre.info/. Enter T159 in the search box to learn more about \"Leg and Ankle Edema: Care Instructions. \" Current as of: March 20, 2017 Content Version: 11.3 © 3070-3162 ISD Corporation. Care instructions adapted under license by [employer] [address] Lane (which disclaims liability or warranty for this information). If you have questions about a medical condition or this instruction, always ask your healthcare professional. [place] [address] any warranty or liability for your use of this information. Introducing [facility] & HEALTH SERVICES! Dear Coltete Redmond: Thank you for requesting a VitaFlavor account. Our records indicate that you already have an active VitaFlavor account. You can access your account anytime at [email or web address] Did you know that you can access your hospital and ER discharge instructions at any time in VitaFlavor? You can also review all of your test results from your hospital stay or ER visit. Additional Information If you have questions, please visit the Frequently Asked Questions section of the Wysiwyg website at [email or web address] Remember, Wysiwyg is NOT to be used for urgent needs. For medical emergencies, dial 911. Now available from your iPhone and Android! Please provide this summary of care documentation to your next provider. Your primary care clinician is listed as Kayla Tse. If you have any questions after today's visit, please call [phone].

## 2017-08-04 NOTE — PATIENT INSTRUCTIONS
Leg and Ankle Edema: Care Instructions  Your Care Instructions  Swelling in the legs, ankles, and feet is called edema. It is common after you sit or stand for a while. Long plane flights or car rides often cause swelling in the legs and feet. You may also have swelling if you have to stand for long periods of time at your job. Problems with the veins in the legs (varicose veins) and changes in hormones can also cause swelling. Sometimes the swelling in the ankles and feet is caused by a more serious problem, such as heart failure, infection, blood clots, or liver or kidney disease. Follow-up care is a key part of your treatment and safety. Be sure to make and go to all appointments, and call your doctor if you are having problems. Its also a good idea to know your test results and keep a list of the medicines you take. How can you care for yourself at home? · If your doctor gave you medicine, take it as prescribed. Call your doctor if you think you are having a problem with your medicine. · Whenever you are resting, raise your legs up. Try to keep the swollen area higher than the level of your heart. · Take breaks from standing or sitting in one position. ¨ Walk around to increase the blood flow in your lower legs. ¨ Move your feet and ankles often while you stand, or tighten and relax your leg muscles. · Wear support stockings. Put them on in the morning, before swelling gets worse. · Eat a balanced diet. Lose weight if you need to. · Limit the amount of salt (sodium) in your diet. Salt holds fluid in the body and may increase swelling. When should you call for help? Call 911 anytime you think you may need emergency care. For example, call if:  · You have symptoms of a blood clot in your lung (called a pulmonary embolism). These may include:  ¨ Sudden chest pain. ¨ Trouble breathing. ¨ Coughing up blood.   Call your doctor now or seek immediate medical care if:  · You have signs of a blood clot, such as:  ¨ Pain in your calf, back of the knee, thigh, or groin. ¨ Redness and swelling in your leg or groin. · You have symptoms of infection, such as:  ¨ Increased pain, swelling, warmth, or redness. ¨ Red streaks or pus. ¨ A fever. Watch closely for changes in your health, and be sure to contact your doctor if:  · Your swelling is getting worse. · You have new or worsening pain in your legs. · You do not get better as expected. Where can you learn more? Go to http://tiffani-emre.info/. Enter X041 in the search box to learn more about \"Leg and Ankle Edema: Care Instructions. \"  Current as of: March 20, 2017  Content Version: 11.3  © 4520-1503 Integromics. Care instructions adapted under license by MyClean (which disclaims liability or warranty for this information). If you have questions about a medical condition or this instruction, always ask your healthcare professional. [place] [address] any warranty or liability for your use of this information.

## 2017-08-04 NOTE — PROGRESS NOTES
1. Have you been to the ER, urgent care clinic since your last visit? Hospitalized since your last visit? No    2. Have you seen or consulted any other health care providers outside of the [address] [place] [address] since your last visit? Include any pap smears or colon screening. No       Patient here today for swelling in feet and ankles worse in the mornings.

## 2017-09-20 ENCOUNTER — OFFICE VISIT (OUTPATIENT)
Dept: FAMILY MEDICINE CLINIC | Age: 82
End: 2017-09-20

## 2017-09-20 VITALS
WEIGHT: 121.6 LBS | SYSTOLIC BLOOD PRESSURE: 143 MMHG | HEART RATE: 74 BPM | DIASTOLIC BLOOD PRESSURE: 94 MMHG | RESPIRATION RATE: 17 BRPM | BODY MASS INDEX: 27.36 KG/M2 | HEIGHT: 56 IN | TEMPERATURE: 97.1 F

## 2017-09-20 DIAGNOSIS — R25.2 CRAMPS OF LOWER EXTREMITY, UNSPECIFIED LATERALITY: ICD-10-CM

## 2017-09-20 DIAGNOSIS — E78.5 HYPERLIPIDEMIA, UNSPECIFIED HYPERLIPIDEMIA TYPE: ICD-10-CM

## 2017-09-20 DIAGNOSIS — I10 ESSENTIAL HYPERTENSION: Primary | ICD-10-CM

## 2017-09-20 DIAGNOSIS — Z23 ENCOUNTER FOR IMMUNIZATION: ICD-10-CM

## 2017-09-20 DIAGNOSIS — R10.9 RIGHT FLANK PAIN: ICD-10-CM

## 2017-09-20 DIAGNOSIS — H91.93 BILATERAL HEARING LOSS, UNSPECIFIED HEARING LOSS TYPE: ICD-10-CM

## 2017-09-20 NOTE — MR AVS SNAPSHOT
Visit Information Date & Time Provider Department Dept. Phone Encounter #  
 9/20/2017 10:30 AM Matheus Parnell MD [place] [address] [id number] Follow-up Instructions Return in about 3 months (around 12/20/2017) for follow up. Upcoming Health Maintenance Date Due DTaP/Tdap/Td series (1 - Tdap) 8/16/1954 ZOSTER VACCINE AGE 60> 6/16/1993 GLAUCOMA SCREENING Q2Y 8/16/1998 MEDICARE YEARLY EXAM 8/16/1998 Pneumococcal 65+ Low/Medium Risk (2 of 2 - PPSV23) 11/3/2016 Allergies as of 9/20/2017  Review Complete On: 9/20/2017 By: Matheus Parnell MD  
 No Known Allergies Current Immunizations  Reviewed on 9/20/2017 Name Date Influenza High Dose Vaccine PF  Incomplete ZZZ-RETIRED (DO NOT USE) Pneumococcal Vaccine (Unspecified Type) 11/3/2011 Reviewed by Jean Balderas LPN on 0/94/8093 at 48:37 AM  
 Reviewed by Matheus Parnell MD on 9/20/2017 at 11:17 AM  
You Were Diagnosed With   
  
 Codes Comments Essential hypertension    -  Primary ICD-10-CM: I10 
ICD-9-CM: 401.9 Hyperlipidemia, unspecified hyperlipidemia type     ICD-10-CM: E78.5 ICD-9-CM: 272.4 Bilateral hearing loss, unspecified hearing loss type     ICD-10-CM: H91.93 
ICD-9-CM: 389.9 Cramps of lower extremity, unspecified laterality     ICD-10-CM: R25.2 ICD-9-CM: 729.82 Right flank pain     ICD-10-CM: R10.9 ICD-9-CM: 789.09 Encounter for immunization     ICD-10-CM: Y90 ICD-9-CM: V03.89 Vitals BP Pulse Temp Resp Height(growth percentile) Weight(growth percentile) (!) 143/94 74 97.1 °F (36.2 °C) (Oral) 17 4' 8\" (1.422 m) 121 lb 9.6 oz (55.2 kg) BMI OB Status Smoking Status 27.26 kg/m2 Postmenopausal Passive Smoke Exposure - Never Smoker Vitals History BMI and BSA Data Body Mass Index Body Surface Area  
 27.26 kg/m 2 1.48 m 2 Preferred Pharmacy Pharmacy Name Phone [id number] [address] [phone]  [phone] Your Updated Medication List  
  
   
This list is accurate as of: 9/20/17 11:33 AM.  Always use your most recent med list.  
  
  
  
  
 calcium 500 mg Tab Take  by mouth. We Performed the Following AMB POC URINALYSIS DIP STICK AUTO W/O MICRO [08758 CPT(R)] INFLUENZA VIRUS VACCINE, HIGH DOSE SEASONAL, PRESERVATIVE FREE [24941 CPT(R)] Follow-up Instructions Return in about 3 months (around 12/20/2017) for follow up. To-Do List   
 09/20/2017 Lab:  CBC WITH AUTOMATED DIFF   
  
 09/20/2017 Microbiology:  CULTURE, URINE   
  
 09/20/2017 Lab:  LIPID PANEL   
  
 09/20/2017 Lab:  MAGNESIUM   
  
 09/20/2017 Lab:  METABOLIC PANEL, COMPREHENSIVE Patient Instructions DASH Diet: Care Instructions Your Care Instructions The DASH diet is an eating plan that can help lower your blood pressure. DASH stands for Dietary Approaches to Stop Hypertension. Hypertension is high blood pressure. The DASH diet focuses on eating foods that are high in calcium, potassium, and magnesium. These nutrients can lower blood pressure. The foods that are highest in these nutrients are fruits, vegetables, low-fat dairy products, nuts, seeds, and legumes. But taking calcium, potassium, and magnesium supplements instead of eating foods that are high in those nutrients does not have the same effect. The DASH diet also includes whole grains, fish, and poultry. The DASH diet is one of several lifestyle changes your doctor may recommend to lower your high blood pressure. Your doctor may also want you to decrease the amount of sodium in your diet. Lowering sodium while following the DASH diet can lower blood pressure even further than just the DASH diet alone. Follow-up care is a key part of your treatment and safety.  Be sure to make and go to all appointments, and call your doctor if you are having problems. It's also a good idea to know your test results and keep a list of the medicines you take. How can you care for yourself at home? Following the DASH diet · Eat 4 to 5 servings of fruit each day. A serving is 1 medium-sized piece of fruit, ½ cup chopped or canned fruit, 1/4 cup dried fruit, or 4 ounces (½ cup) of fruit juice. Choose fruit more often than fruit juice. · Eat 4 to 5 servings of vegetables each day. A serving is 1 cup of lettuce or raw leafy vegetables, ½ cup of chopped or cooked vegetables, or 4 ounces (½ cup) of vegetable juice. Choose vegetables more often than vegetable juice. · Get 2 to 3 servings of low-fat and fat-free dairy each day. A serving is 8 ounces of milk, 1 cup of yogurt, or 1 ½ ounces of cheese. · Eat 6 to 8 servings of grains each day. A serving is 1 slice of bread, 1 ounce of dry cereal, or ½ cup of cooked rice, pasta, or cooked cereal. Try to choose whole-grain products as much as possible. · Limit lean meat, poultry, and fish to 2 servings each day. A serving is 3 ounces, about the size of a deck of cards. · Eat 4 to 5 servings of nuts, seeds, and legumes (cooked dried beans, lentils, and split peas) each week. A serving is 1/3 cup of nuts, 2 tablespoons of seeds, or ½ cup of cooked beans or peas. · Limit fats and oils to 2 to 3 servings each day. A serving is 1 teaspoon of vegetable oil or 2 tablespoons of salad dressing. · Limit sweets and added sugars to 5 servings or less a week. A serving is 1 tablespoon jelly or jam, ½ cup sorbet, or 1 cup of lemonade. · Eat less than 2,300 milligrams (mg) of sodium a day. If you limit your sodium to 1,500 mg a day, you can lower your blood pressure even more. Tips for success · Start small. Do not try to make dramatic changes to your diet all at once.  You might feel that you are missing out on your favorite foods and then be more likely to not follow the plan. Make small changes, and stick with them. Once those changes become habit, add a few more changes. · Try some of the following: ¨ Make it a goal to eat a fruit or vegetable at every meal and at snacks. This will make it easy to get the recommended amount of fruits and vegetables each day. ¨ Try yogurt topped with fruit and nuts for a snack or healthy dessert. ¨ Add lettuce, tomato, cucumber, and onion to sandwiches. ¨ Combine a ready-made pizza crust with low-fat mozzarella cheese and lots of vegetable toppings. Try using tomatoes, squash, spinach, broccoli, carrots, cauliflower, and onions. ¨ Have a variety of cut-up vegetables with a low-fat dip as an appetizer instead of chips and dip. ¨ Sprinkle sunflower seeds or chopped almonds over salads. Or try adding chopped walnuts or almonds to cooked vegetables. ¨ Try some vegetarian meals using beans and peas. Add garbanzo or kidney beans to salads. Make burritos and tacos with mashed perez beans or black beans. Where can you learn more? Go to http://tiffani-emre.info/. Enter R474 in the search box to learn more about \"DASH Diet: Care Instructions. \" Current as of: April 3, 2017 Content Version: 11.3 © 7981-9021 Bionanoplus. Care instructions adapted under license by Geomagic (which disclaims liability or warranty for this information). If you have questions about a medical condition or this instruction, always ask your healthcare professional. [place] [address] any warranty or liability for your use of this information. Introducing [facility] & HEALTH SERVICES! Dear Addis Stock: Thank you for requesting a REach account. Our records indicate that you already have an active REach account. You can access your account anytime at [email or web address] Did you know that you can access your hospital and ER discharge instructions at any time in v2tel? You can also review all of your test results from your hospital stay or ER visit. Additional Information If you have questions, please visit the Frequently Asked Questions section of the [email or web address] website at [email or web address] Remember, v2tel is NOT to be used for urgent needs. For medical emergencies, dial 911. Now available from your iPhone and Android! Please provide this summary of care documentation to your next provider. Your primary care clinician is listed as Matt Bowman. If you have any questions after today's visit, please call [phone].

## 2017-09-20 NOTE — PROGRESS NOTES
Chief Complaint   Patient presents with    Cholesterol Problem     *Follow up       Pt is a 80y.o. year old female who presents for follow up of her chronic medical problems    Right flank pain  Hx of UTI  Occasional RLQ pain    Did not take Norvasc-had edema  BP Readings from Last 3 Encounters:   09/20/17 (!) 143/94   08/04/17 149/83   06/20/17 158/80   repeat BP-better but still elevated    Taking calcium daily   Declined to do dexa again  [facility] for exercise daily    Some hearing loss-needs to reschedule ENT appt    Health Maintenance Due   Topic Date Due    DTaP/Tdap/Td series (1 - Tdap) 08/16/1954    ZOSTER VACCINE AGE 60>  06/16/1993    GLAUCOMA SCREENING Q2Y  08/16/1998-wears eyeglasses    MEDICARE YEARLY EXAM  08/16/1998-not medicare    Pneumococcal 65+ Low/Medium Risk (2 of 2 - PPSV23) 11/03/2016     ROS:    Pt denies: Wt loss, Fever/Chills, HA, Visual changes, Fatigue, Chest pain, SOB, WORKMAN, Abd pain, N/V/D/C, Blood in stool or urine, Edema. Pertinent positive as above in HPI. All others were negative    Patient Active Problem List   Diagnosis Code    Spina bifida 26    Hyperlipidemia E78.5    Anemia D64.9    Osteoporosis M81.0    Bilateral hearing loss H91.93       Past Medical History:   Diagnosis Date    Arthritis     Chronic pain     GERD (gastroesophageal reflux disease)        Current Outpatient Prescriptions   Medication Sig Dispense Refill    calcium 500 mg Tab Take  by mouth.  nitrofurantoin, macrocrystal-monohydrate, (MACROBID) 100 mg capsule Take 1 Cap by mouth two (2) times a day.  14 Cap 0       History   Smoking Status    Passive Smoke Exposure - Never Smoker    Packs/day: 0.00    Types: Cigarettes    Last attempt to quit: 11/3/2010   Smokeless Tobacco    Never Used       No Known Allergies    Patient Labs were reviewed: yes      Patient Past Records were reviewed:  yes        Objective:     Vitals:    09/20/17 1045 09/20/17 1047   BP: (!) 161/94 (!) 143/94   Pulse: 62 74   Resp: 17    Temp: 97.1 °F (36.2 °C)    TempSrc: Oral    Weight: 121 lb 9.6 oz (55.2 kg)    Height: 4' 8\" (1.422 m)      Body mass index is 27.26 kg/(m^2). Exam:   Appearance: alert, well appearing,  oriented to person, place, and time, acyanotic, in no respiratory distress and well hydrated. HEENT:  NC/AT, pink conj, anicteric sclerae, no cerumen impaction  Neck:  No cervical lymphadenopathy, no JVD, no thyromegaly, no carotid bruit  Heart:  RRR without M/R/G  Lungs:  CTAB, no rhonchi, rales, or wheezes with good air exchange   Abdomen:  Non-tender, pos bowel sounds, no hepatosplenomegaly, no CVa tenderness  Ext:  No C/C/E    Skin: no rash  Neuro: no lateralizing signs, CNs II-XII intact      Assessment/ Plan:   Diagnoses and all orders for this visit:    1. Essential hypertension-BP slightly high, advised re low sodium diet  -     CBC WITH AUTOMATED DIFF; Future  -     METABOLIC PANEL, COMPREHENSIVE; Future    2. Hyperlipidemia, unspecified hyperlipidemia type-low cholesterol diet  -     LIPID PANEL; Future    3. Bilateral hearing loss, unspecified hearing loss type-reschedule ENT appt    4. Cramps of lower extremity, unspecified laterality  -     MAGNESIUM; Future    5. Right flank pain  -     CULTURE, URINE; Future  -     AMB POC URINALYSIS DIP STICK AUTO W/O MICRO    6. Encounter for immunization  -     INFLUENZA VIRUS VACCINE, HIGH DOSE SEASONAL, PRESERVATIVE FREE      Follow-up Disposition:  Return in about 3 months (around 12/20/2017) for follow up. I have discussed the diagnosis with the patient and the intended plan as seen in the above orders. The patient has received an After-Visit Summary and questions were answered concerning future plans. Medication Side Effects and Warnings were discussed with patient: yes    Patient verbalized understanding of above instructions.     Jacey Liz MD  Internal Medicine  [address] [facility]

## 2017-09-20 NOTE — PROGRESS NOTES
1. Have you been to the ER, urgent care clinic since your last visit? Hospitalized since your last visit? No    2. Have you seen or consulted any other health care providers outside of the [address] [place] [address] since your last visit? Include any pap smears or colon screening.  No       Patient presents for follow up visit

## 2017-09-20 NOTE — PATIENT INSTRUCTIONS

## 2017-09-21 LAB
A-G RATIO,AGRAT: 1.6 RATIO (ref 1.1–2.6)
ABSOLUTE LYMPHOCYTE COUNT, 10803: 2.1 K/UL (ref 1–4.8)
ALBUMIN SERPL-MCNC: 4.6 G/DL (ref 3.5–5)
ALP SERPL-CCNC: 58 U/L (ref 40–120)
ALT SERPL-CCNC: 21 U/L (ref 5–40)
ANION GAP SERPL CALC-SCNC: 19 MMOL/L
AST SERPL W P-5'-P-CCNC: 28 U/L (ref 10–37)
BASOPHILS # BLD: 0 K/UL (ref 0–0.2)
BASOPHILS NFR BLD: 0 % (ref 0–2)
BILIRUB SERPL-MCNC: 0.5 MG/DL (ref 0.2–1.2)
BUN SERPL-MCNC: 10 MG/DL (ref 6–22)
CALCIUM SERPL-MCNC: 9.3 MG/DL (ref 8.4–10.5)
CHLORIDE SERPL-SCNC: 96 MMOL/L (ref 98–110)
CHOLEST SERPL-MCNC: 246 MG/DL (ref 110–200)
CO2 SERPL-SCNC: 25 MMOL/L (ref 20–32)
CREAT SERPL-MCNC: 0.7 MG/DL (ref 0.8–1.4)
EOSINOPHIL # BLD: 0.1 K/UL (ref 0–0.5)
EOSINOPHIL NFR BLD: 2 % (ref 0–6)
ERYTHROCYTE [DISTWIDTH] IN BLOOD BY AUTOMATED COUNT: 16.6 % (ref 10–16)
GFRAA, 66117: >60
GFRNA, 66118: >60
GLOBULIN,GLOB: 2.9 G/DL (ref 2–4)
GLUCOSE SERPL-MCNC: 100 MG/DL (ref 65–99)
GRANULOCYTES,GRANS: 45 % (ref 40–75)
HCT VFR BLD AUTO: 39.3 % (ref 35.1–48.3)
HDLC SERPL-MCNC: 72 MG/DL (ref 40–59)
HGB BLD-MCNC: 12.4 G/DL (ref 11.7–16.1)
LDLC SERPL CALC-MCNC: 140 MG/DL (ref 50–99)
LYMPHOCYTES, LYMLT: 44 % (ref 27–45)
MAGNESIUM SERPL-MCNC: 2.3 MG/DL (ref 1.6–2.5)
MCH RBC QN AUTO: 23 PG (ref 26–34)
MCHC RBC AUTO-ENTMCNC: 32 G/DL (ref 32–36)
MCV RBC AUTO: 74 FL (ref 80–95)
MONOCYTES # BLD: 0.4 K/UL (ref 0.1–0.9)
MONOCYTES NFR BLD: 9 % (ref 3–9)
NEUTROPHILS # BLD AUTO: 2.1 K/UL (ref 1.8–7.7)
PLATELET # BLD AUTO: 259 K/UL (ref 140–440)
PMV BLD AUTO: 10.4 FL (ref 6–10.8)
POTASSIUM SERPL-SCNC: 4.3 MMOL/L (ref 3.5–5.5)
PROT SERPL-MCNC: 7.5 G/DL (ref 6.2–8.1)
RBC # BLD AUTO: 5.34 M/UL (ref 3.8–5.2)
SODIUM SERPL-SCNC: 140 MMOL/L (ref 133–145)
TRIGL SERPL-MCNC: 172 MG/DL (ref 40–149)
VLDLC SERPL CALC-MCNC: 34 MG/DL (ref 8–30)
WBC # BLD AUTO: 4.7 K/UL (ref 4–11)

## 2017-09-21 NOTE — PROGRESS NOTES
pls let patient know-labs normal except for elevated cholesterol but HDL or good cholesterol is high so just watch diet for now

## 2017-09-23 LAB — CULTURE RESULT, SENTARA: ABNORMAL

## 2017-09-25 DIAGNOSIS — N39.0 URINARY TRACT INFECTION WITHOUT HEMATURIA, SITE UNSPECIFIED: Primary | ICD-10-CM

## 2017-09-25 RX ORDER — NITROFURANTOIN 25; 75 MG/1; MG/1
100 CAPSULE ORAL 2 TIMES DAILY
Qty: 14 CAP | Refills: 0 | Status: SHIPPED | OUTPATIENT
Start: 2017-09-25 | End: 2018-06-06

## 2017-11-30 NOTE — PROGRESS NOTES
Per patient she wants to get this done when she comes back from the [facility] in 3/2018.

## 2017-12-20 ENCOUNTER — OFFICE VISIT (OUTPATIENT)
Dept: FAMILY MEDICINE CLINIC | Age: 82
End: 2017-12-20

## 2017-12-20 VITALS
WEIGHT: 121 LBS | HEIGHT: 56 IN | BODY MASS INDEX: 27.22 KG/M2 | OXYGEN SATURATION: 97 % | HEART RATE: 78 BPM | RESPIRATION RATE: 18 BRPM | DIASTOLIC BLOOD PRESSURE: 87 MMHG | TEMPERATURE: 97.3 F | SYSTOLIC BLOOD PRESSURE: 163 MMHG

## 2017-12-20 DIAGNOSIS — K21.9 GASTROESOPHAGEAL REFLUX DISEASE, ESOPHAGITIS PRESENCE NOT SPECIFIED: ICD-10-CM

## 2017-12-20 DIAGNOSIS — E78.5 HYPERLIPIDEMIA, UNSPECIFIED HYPERLIPIDEMIA TYPE: ICD-10-CM

## 2017-12-20 DIAGNOSIS — I10 ESSENTIAL HYPERTENSION: Primary | ICD-10-CM

## 2017-12-20 DIAGNOSIS — H91.93 BILATERAL HEARING LOSS, UNSPECIFIED HEARING LOSS TYPE: ICD-10-CM

## 2017-12-20 LAB
ABSOLUTE LYMPHOCYTE COUNT, 10803: 1.7 K/UL (ref 1–4.8)
BASOPHILS # BLD: 0 K/UL (ref 0–0.2)
BASOPHILS NFR BLD: 0 % (ref 0–2)
EOSINOPHIL # BLD: 0.1 K/UL (ref 0–0.5)
EOSINOPHIL NFR BLD: 2 % (ref 0–6)
ERYTHROCYTE [DISTWIDTH] IN BLOOD BY AUTOMATED COUNT: 15.3 % (ref 10–16)
GRANULOCYTES,GRANS: 55 % (ref 40–75)
HCT VFR BLD AUTO: 38.4 % (ref 35.1–48.3)
HGB BLD-MCNC: 12.2 G/DL (ref 11.7–16.1)
LYMPHOCYTES, LYMLT: 36 % (ref 27–45)
MCH RBC QN AUTO: 23 PG (ref 26–34)
MCHC RBC AUTO-ENTMCNC: 32 G/DL (ref 32–36)
MCV RBC AUTO: 72 FL (ref 80–95)
MONOCYTES # BLD: 0.3 K/UL (ref 0.1–0.9)
MONOCYTES NFR BLD: 6 % (ref 3–9)
NEUTROPHILS # BLD AUTO: 2.5 K/UL (ref 1.8–7.7)
PLATELET # BLD AUTO: 250 K/UL (ref 140–440)
PMV BLD AUTO: 10.2 FL (ref 6–10.8)
RBC # BLD AUTO: 5.35 M/UL (ref 3.8–5.2)
WBC # BLD AUTO: 4.6 K/UL (ref 4–11)

## 2017-12-20 RX ORDER — PHENOL/SODIUM PHENOLATE
20 AEROSOL, SPRAY (ML) MUCOUS MEMBRANE DAILY
Qty: 30 TAB | Refills: 3 | Status: SHIPPED | OUTPATIENT
Start: 2017-12-20 | End: 2018-06-06

## 2017-12-20 RX ORDER — HYDROCHLOROTHIAZIDE 12.5 MG/1
12.5 TABLET ORAL DAILY
Qty: 90 TAB | Refills: 1 | Status: SHIPPED | OUTPATIENT
Start: 2017-12-20 | End: 2018-06-06

## 2017-12-20 NOTE — PATIENT INSTRUCTIONS

## 2017-12-20 NOTE — PROGRESS NOTES
Chief Complaint   Patient presents with    Follow Up Chronic Condition     3 month     1. Have you been to the ER, urgent care clinic since your last visit? Hospitalized since your last visit? No    2. Have you seen or consulted any other health care providers outside of the [address] [place] [address] since your last visit? Include any pap smears or colon screening.  No

## 2017-12-20 NOTE — PROGRESS NOTES
Chief Complaint   Patient presents with    Follow Up Chronic Condition     3 month       Pt is a 80y.o. year old female who presents for follow up of her chronic medical problems    BP Readings from Last 3 Encounters:   12/20/17 163/87   09/20/17 (!) 143/94   08/04/17 149/83   Repeat BP-still elevated      Lab Results   Component Value Date/Time    Cholesterol, total 246 09/20/2017 11:36 AM    HDL Cholesterol 72 09/20/2017 11:36 AM    LDL, calculated 140 09/20/2017 11:36 AM    VLDL, calculated 34 09/20/2017 11:36 AM    Triglyceride 172 09/20/2017 11:36 AM   Not on statin but has been more careful with ehr diet      Wt Readings from Last 3 Encounters:   12/20/17 (P) 121 lb (54.9 kg)   09/20/17 121 lb 9.6 oz (55.2 kg)   08/04/17 123 lb (55.8 kg)       Health Maintenance Due   Topic Date Due    DTaP/Tdap/Td series (1 - Tdap) 08/16/1954    ZOSTER VACCINE AGE 60>  06/16/1993    GLAUCOMA SCREENING Q2Y  08/16/1998    MEDICARE YEARLY EXAM  08/16/1998-does not have Medicare    Pneumococcal 65+ Low/Medium Risk (2 of 2 - PPSV23) 11/03/2016     Declined ENT for hearing loss    New sx: heartburn, admits to eating spicy food a lot    ROS:    Pt denies: Wt loss, Fever/Chills, HA, Visual changes, Fatigue, Chest pain, SOB, WORKMAN, Abd pain, N/V/D/C, Blood in stool or urine, Edema. Pertinent positive as above in HPI. All others were negative    Patient Active Problem List   Diagnosis Code    Spina bifida 26    Hyperlipidemia E78.5    Anemia D64.9    Osteoporosis M81.0    Bilateral hearing loss H91.93       Past Medical History:   Diagnosis Date    Arthritis     Chronic pain     GERD (gastroesophageal reflux disease)        Current Outpatient Prescriptions   Medication Sig Dispense Refill    calcium 500 mg Tab Take  by mouth.          History   Smoking Status    Passive Smoke Exposure - Never Smoker    Packs/day: 0.00    Types: Cigarettes    Last attempt to quit: 11/3/2010   Smokeless Tobacco    Never Used       No Known Allergies    Patient Labs were reviewed: yes      Patient Past Records were reviewed:  yes        Objective:     Vitals:    12/20/17 1110 12/20/17 1129   BP:  163/87   Pulse: 78    Resp: 18    Temp: 97.3 °F (36.3 °C)    TempSrc: Oral    SpO2: 97%    Weight: 121 lb (54.9 kg)    Height: 4' 8\" (1.422 m)      Body mass index is 27.13 kg/(m^2). Exam:   Appearance: alert, well appearing,  oriented to person, place, and time, acyanotic, in no respiratory distress and well hydrated. HEENT:  NC/AT, pink conj, anicteric sclerae, no cerumen impaction  Neck:  No cervical lymphadenopathy, no JVD, no thyromegaly, no carotid bruit  Heart:  RRR without M/R/G  Lungs:  CTAB, no rhonchi, rales, or wheezes with good air exchange   Abdomen:  Non-tender, pos bowel sounds, no hepatosplenomegaly  Ext:  No C/C/E    Skin: no rash  Neuro: no lateralizing signs, CNs II-XII intact      Assessment/ Plan:   Diagnoses and all orders for this visit:    1. Essential hypertension-uncontrolled, will start low dose diuretic; low salt diet discussed  -     hydroCHLOROthiazide (HYDRODIURIL) 12.5 mg tablet; Take 1 Tab by mouth daily.  -     CBC WITH AUTOMATED DIFF; Future  -     METABOLIC PANEL, COMPREHENSIVE; Future    2. Hyperlipidemia, unspecified hyperlipidemia type-low cholesterol diet discussed  -     LIPID PANEL; Future  -     METABOLIC PANEL, COMPREHENSIVE; Future    3. Gastroesophageal reflux disease, esophagitis presence not specified-lifestyle changes and avoidance of foods causing her the sx advised  -     Omeprazole delayed release (PRILOSEC D/R) 20 mg tablet; Take 1 Tab by mouth daily.  -     CBC WITH AUTOMATED DIFF; Future    4. Bilateral hearing loss, unspecified hearing loss type-will see ENT in the near future/after she gets back from the [facility] she says      Follow-up Disposition:  Return in about 3 months (around 3/20/2018) for follow up.         I have discussed the diagnosis with the patient and the intended plan as seen in the above orders. The patient has received an After-Visit Summary and questions were answered concerning future plans. Medication Side Effects and Warnings were discussed with patient: yes    Patient verbalized understanding of above instructions.     Terrance Meza MD  Internal Medicine  [facility]

## 2017-12-20 NOTE — MR AVS SNAPSHOT
Visit Information Date & Time Provider Department Dept. Phone Encounter #  
 12/20/2017 11:15 AM Joan Wren MD [place] [address] [id number] Follow-up Instructions Return in about 3 months (around 3/20/2018) for follow up. Upcoming Health Maintenance Date Due DTaP/Tdap/Td series (1 - Tdap) 8/16/1954 ZOSTER VACCINE AGE 60> 6/16/1993 GLAUCOMA SCREENING Q2Y 8/16/1998 MEDICARE YEARLY EXAM 8/16/1998 Pneumococcal 65+ Low/Medium Risk (2 of 2 - PPSV23) 11/3/2016 Allergies as of 12/20/2017  Review Complete On: 12/20/2017 By: Joan Wren MD  
 No Known Allergies Current Immunizations  Reviewed on 12/20/2017 Name Date Influenza High Dose Vaccine PF 9/20/2017 ZZZ-RETIRED (DO NOT USE) Pneumococcal Vaccine (Unspecified Type) 11/3/2011 Reviewed by Joan Wren MD on 12/20/2017 at 11:45 AM  
 Reviewed by Joan Wren MD on 12/20/2017 at 11:45 AM  
You Were Diagnosed With   
  
 Codes Comments Essential hypertension    -  Primary ICD-10-CM: I10 
ICD-9-CM: 401.9 Hyperlipidemia, unspecified hyperlipidemia type     ICD-10-CM: E78.5 ICD-9-CM: 272.4 Gastroesophageal reflux disease, esophagitis presence not specified     ICD-10-CM: K21.9 ICD-9-CM: 530.81 Bilateral hearing loss, unspecified hearing loss type     ICD-10-CM: H91.93 
ICD-9-CM: 389. 9 Vitals BP Pulse Temp Resp Height(growth percentile) Weight(growth percentile) 163/87 78 97.3 °F (36.3 °C) (Oral) 18 4' 8\" (1.422 m) 121 lb (54.9 kg) SpO2 BMI OB Status Smoking Status 97% 27.13 kg/m2 Postmenopausal Passive Smoke Exposure - Never Smoker Vitals History BMI and BSA Data Body Mass Index Body Surface Area  
 27.13 kg/m 2 1.47 m 2 Preferred Pharmacy Pharmacy Name Phone [id number] [phone] [phone] Your Updated Medication List  
  
   
 This list is accurate as of: 12/20/17 11:59 AM.  Always use your most recent med list.  
  
  
  
  
 calcium 500 mg Tab Take  by mouth. hydroCHLOROthiazide 12.5 mg tablet Commonly known as:  HYDRODIURIL Take 1 Tab by mouth daily. nitrofurantoin (macrocrystal-monohydrate) 100 mg capsule Commonly known as:  MACROBID Take 1 Cap by mouth two (2) times a day. Omeprazole delayed release 20 mg tablet Commonly known as:  PRILOSEC D/R Take 1 Tab by mouth daily. Prescriptions Sent to Pharmacy Refills  
 hydroCHLOROthiazide (HYDRODIURIL) 12.5 mg tablet 1 Sig: Take 1 Tab by mouth daily. Class: Normal  
 Pharm[id number] [phone] Ph #: [phone] Route: Oral  
 Omeprazole delayed release (PRILOSEC D/R) 20 mg tablet 3 Sig: Take 1 Tab by mouth daily. Class: Normal  
 Pharmacy: [id number] [phone] Ph #: [phone] Route: Oral  
  
Follow-up Instructions Return in about 3 months (around 3/20/2018) for follow up. To-Do List   
 12/20/2017 Lab:  CBC WITH AUTOMATED DIFF   
  
 12/20/2017 Lab:  LIPID PANEL   
  
 12/20/2017 Lab:  METABOLIC PANEL, COMPREHENSIVE Patient Instructions Gastroesophageal Reflux Disease (GERD): Care Instructions Your Care Instructions Gastroesophageal reflux disease (GERD) is the backward flow of stomach acid into the esophagus. The esophagus is the tube that leads from your throat to your stomach. A one-way valve prevents the stomach acid from moving up into this tube. When you have GERD, this valve does not close tightly enough. If you have mild GERD symptoms including heartburn, you may be able to control the problem with antacids or over-the-counter medicine. Changing your diet, losing weight, and making other lifestyle changes can also help reduce symptoms. Follow-up care is a key part of your treatment and safety. Be sure to make and go to all appointments, and call your doctor if you are having problems. It's also a good idea to know your test results and keep a list of the medicines you take. How can you care for yourself at home? · Take your medicines exactly as prescribed. Call your doctor if you think you are having a problem with your medicine. · Your doctor may recommend over-the-counter medicine. For mild or occasional indigestion, antacids, such as Tums, Gaviscon, Mylanta, or Maalox, may help. Your doctor also may recommend over-the-counter acid reducers, such as Pepcid AC, Tagamet HB, Zantac 75, or Prilosec. Read and follow all instructions on the label. If you use these medicines often, talk with your doctor. · Change your eating habits. ¨ It's best to eat several small meals instead of two or three large meals. ¨ After you eat, wait 2 to 3 hours before you lie down. ¨ Chocolate, mint, and alcohol can make GERD worse. ¨ Spicy foods, foods that have a lot of acid (like tomatoes and oranges), and coffee can make GERD symptoms worse in some people. If your symptoms are worse after you eat a certain food, you may want to stop eating that food to see if your symptoms get better. · Do not smoke or chew tobacco. Smoking can make GERD worse. If you need help quitting, talk to your doctor about stop-smoking programs and medicines. These can increase your chances of quitting for good. · If you have GERD symptoms at night, raise the head of your bed 6 to 8 inches by putting the frame on blocks or placing a foam wedge under the head of your mattress. (Adding extra pillows does not work.) · Do not wear tight clothing around your middle. · Lose weight if you need to. Losing just 5 to 10 pounds can help. When should you call for help? Call your doctor now or seek immediate medical care if: 
? · You have new or different belly pain. ? · Your stools are black and tarlike or have streaks of blood. ? Watch closely for changes in your health, and be sure to contact your doctor if: 
? · Your symptoms have not improved after 2 days. ? · Food seems to catch in your throat or chest.  
Where can you learn more? Go to http://tiffani-emre.info/. Enter L095 in the search box to learn more about \"Gastroesophageal Reflux Disease (GERD): Care Instructions. \" Current as of: May 12, 2017 Content Version: 11.4 © 5115-3484 [employer]. Care instructions adapted under license by [employer] (which disclaims liability or warranty for this information). If you have questions about a medical condition or this instruction, always ask your healthcare professional. [address] any warranty or liability for your use of this information. Introducing [facility]! Dear Chino Hackett: Thank you for requesting a [email or web address] account. Our records indicate that you already have an active [email or web address] account. You can access your account anytime at [email or web address] Did you know that you can access your hospital and ER discharge instructions at any time in [email or web address]? You can also review all of your test results from your hospital stay or ER visit. Additional Information If you have questions, please visit the Frequently Asked Questions section of the [email or web address] website at [email or web address] Remember, [email or web address] is NOT to be used for urgent needs. For medical emergencies, dial 911. Now available from your iPhone and Android! Please provide this summary of care documentation to your next provider. Your primary care clinician is listed as Sotero Miller. If you have any questions after today's visit, please call [phone].

## 2017-12-21 LAB
A-G RATIO,AGRAT: 1.4 RATIO (ref 1.1–2.6)
ALBUMIN SERPL-MCNC: 4.2 G/DL (ref 3.5–5)
ALP SERPL-CCNC: 55 U/L (ref 40–120)
ALT SERPL-CCNC: 22 U/L (ref 5–40)
ANION GAP SERPL CALC-SCNC: 17 MMOL/L
AST SERPL W P-5'-P-CCNC: 26 U/L (ref 10–37)
BILIRUB SERPL-MCNC: 0.4 MG/DL (ref 0.2–1.2)
BUN SERPL-MCNC: 15 MG/DL (ref 6–22)
CALCIUM SERPL-MCNC: 9.3 MG/DL (ref 8.4–10.5)
CHLORIDE SERPL-SCNC: 103 MMOL/L (ref 98–110)
CHOLEST SERPL-MCNC: 234 MG/DL (ref 110–200)
CO2 SERPL-SCNC: 25 MMOL/L (ref 20–32)
CREAT SERPL-MCNC: 0.6 MG/DL (ref 0.8–1.4)
GFRAA, 66117: >60
GFRNA, 66118: >60
GLOBULIN,GLOB: 3 G/DL (ref 2–4)
GLUCOSE SERPL-MCNC: 92 MG/DL (ref 70–99)
HDLC SERPL-MCNC: 74 MG/DL (ref 40–59)
LDLC SERPL CALC-MCNC: 123 MG/DL (ref 50–99)
POTASSIUM SERPL-SCNC: 4.3 MMOL/L (ref 3.5–5.5)
PROT SERPL-MCNC: 7.2 G/DL (ref 6.2–8.1)
SODIUM SERPL-SCNC: 145 MMOL/L (ref 133–145)
TRIGL SERPL-MCNC: 182 MG/DL (ref 40–149)
VLDLC SERPL CALC-MCNC: 36 MG/DL (ref 8–30)

## 2018-04-25 DIAGNOSIS — R05.9 COUGH: Primary | ICD-10-CM

## 2018-04-25 RX ORDER — DOXYCYCLINE 100 MG/1
100 CAPSULE ORAL 2 TIMES DAILY
Qty: 14 CAP | Refills: 0 | Status: SHIPPED | OUTPATIENT
Start: 2018-04-25 | End: 2018-05-02

## 2018-06-06 ENCOUNTER — HOSPITAL ENCOUNTER (OUTPATIENT)
Dept: LAB | Age: 83
Discharge: HOME | End: 2018-06-06
Payer: MEDICAID

## 2018-06-06 ENCOUNTER — OFFICE VISIT (OUTPATIENT)
Dept: FAMILY MEDICINE CLINIC | Age: 83
End: 2018-06-06

## 2018-06-06 VITALS
OXYGEN SATURATION: 98 % | WEIGHT: 113.2 LBS | HEIGHT: 56 IN | RESPIRATION RATE: 18 BRPM | HEART RATE: 87 BPM | DIASTOLIC BLOOD PRESSURE: 77 MMHG | SYSTOLIC BLOOD PRESSURE: 152 MMHG | BODY MASS INDEX: 25.47 KG/M2 | TEMPERATURE: 98.8 F

## 2018-06-06 DIAGNOSIS — E78.5 HYPERLIPIDEMIA, UNSPECIFIED HYPERLIPIDEMIA TYPE: ICD-10-CM

## 2018-06-06 DIAGNOSIS — R05.3 CHRONIC COUGH: ICD-10-CM

## 2018-06-06 DIAGNOSIS — R63.4 WEIGHT LOSS, NON-INTENTIONAL: ICD-10-CM

## 2018-06-06 DIAGNOSIS — I10 ESSENTIAL HYPERTENSION: ICD-10-CM

## 2018-06-06 DIAGNOSIS — I10 ESSENTIAL HYPERTENSION: Primary | ICD-10-CM

## 2018-06-06 LAB
ALBUMIN SERPL-MCNC: 3.7 G/DL (ref 3.4–5)
ALBUMIN/GLOB SERPL: 1.1 {RATIO} (ref 0.8–1.7)
ALP SERPL-CCNC: 60 U/L (ref 45–117)
ALT SERPL-CCNC: 27 U/L (ref 13–56)
ANION GAP SERPL CALC-SCNC: 8 MMOL/L (ref 3–18)
AST SERPL-CCNC: 26 U/L (ref 15–37)
BASOPHILS # BLD: 0 K/UL (ref 0–0.06)
BASOPHILS NFR BLD: 1 % (ref 0–2)
BILIRUB SERPL-MCNC: 0.5 MG/DL (ref 0.2–1)
BUN SERPL-MCNC: 13 MG/DL (ref 7–18)
BUN/CREAT SERPL: 17 (ref 12–20)
CALCIUM SERPL-MCNC: 8.7 MG/DL (ref 8.5–10.1)
CHLORIDE SERPL-SCNC: 106 MMOL/L (ref 100–108)
CHOLEST SERPL-MCNC: 192 MG/DL
CO2 SERPL-SCNC: 28 MMOL/L (ref 21–32)
CREAT SERPL-MCNC: 0.78 MG/DL (ref 0.6–1.3)
DIFFERENTIAL METHOD BLD: ABNORMAL
EOSINOPHIL # BLD: 0.1 K/UL (ref 0–0.4)
EOSINOPHIL NFR BLD: 3 % (ref 0–5)
ERYTHROCYTE [DISTWIDTH] IN BLOOD BY AUTOMATED COUNT: 15.3 % (ref 11.6–14.5)
GLOBULIN SER CALC-MCNC: 3.5 G/DL (ref 2–4)
GLUCOSE SERPL-MCNC: 91 MG/DL (ref 74–99)
HCT VFR BLD AUTO: 36.6 % (ref 35–45)
HDLC SERPL-MCNC: 63 MG/DL (ref 40–60)
HDLC SERPL: 3 {RATIO} (ref 0–5)
HGB BLD-MCNC: 11.8 G/DL (ref 12–16)
LDLC SERPL CALC-MCNC: 99.4 MG/DL (ref 0–100)
LIPID PROFILE,FLP: ABNORMAL
LYMPHOCYTES # BLD: 2 K/UL (ref 0.9–3.6)
LYMPHOCYTES NFR BLD: 42 % (ref 21–52)
MCH RBC QN AUTO: 23.3 PG (ref 24–34)
MCHC RBC AUTO-ENTMCNC: 32.2 G/DL (ref 31–37)
MCV RBC AUTO: 72.3 FL (ref 74–97)
MONOCYTES # BLD: 0.4 K/UL (ref 0.05–1.2)
MONOCYTES NFR BLD: 8 % (ref 3–10)
NEUTS SEG # BLD: 2.3 K/UL (ref 1.8–8)
NEUTS SEG NFR BLD: 46 % (ref 40–73)
PLATELET # BLD AUTO: 264 K/UL (ref 135–420)
PMV BLD AUTO: 10.5 FL (ref 9.2–11.8)
POTASSIUM SERPL-SCNC: 4 MMOL/L (ref 3.5–5.5)
PROT SERPL-MCNC: 7.2 G/DL (ref 6.4–8.2)
RBC # BLD AUTO: 5.06 M/UL (ref 4.2–5.3)
SODIUM SERPL-SCNC: 142 MMOL/L (ref 136–145)
TRIGL SERPL-MCNC: 148 MG/DL (ref ?–150)
VLDLC SERPL CALC-MCNC: 29.6 MG/DL
WBC # BLD AUTO: 4.8 K/UL (ref 4.6–13.2)

## 2018-06-06 PROCEDURE — 80053 COMPREHEN METABOLIC PANEL: CPT | Performed by: INTERNAL MEDICINE

## 2018-06-06 PROCEDURE — 85025 COMPLETE CBC W/AUTO DIFF WBC: CPT | Performed by: INTERNAL MEDICINE

## 2018-06-06 PROCEDURE — 36415 COLL VENOUS BLD VENIPUNCTURE: CPT | Performed by: INTERNAL MEDICINE

## 2018-06-06 PROCEDURE — 80061 LIPID PANEL: CPT | Performed by: INTERNAL MEDICINE

## 2018-06-06 RX ORDER — AMLODIPINE BESYLATE 5 MG/1
5 TABLET ORAL DAILY
Qty: 90 TAB | Refills: 1 | Status: SHIPPED | OUTPATIENT
Start: 2018-06-06 | End: 2018-12-09 | Stop reason: SDUPTHER

## 2018-06-06 RX ORDER — MULTIVITAMIN
1 TABLET ORAL DAILY
COMMUNITY

## 2018-06-06 NOTE — PATIENT INSTRUCTIONS

## 2018-06-06 NOTE — PROGRESS NOTES
1. Have you been to the ER, urgent care clinic since your last visit? Hospitalized since your last visit? No    2. Have you seen or consulted any other health care providers outside of the [address] [place] [address] since your last visit? Include any pap smears or colon screening.  No

## 2018-06-06 NOTE — PROGRESS NOTES
Chief Complaint   Patient presents with    Follow-up     6 month; patient is fasting       Pt is a 80y.o. year old female who presents for follow up of her chronic medical problems    BP Readings from Last 3 Encounters:   06/06/18 152/77   12/20/17 163/87   09/20/17 (!) 143/94   Repeat BP-still elevated    Wt Readings from Last 3 Encounters:   06/06/18 113 lb 3.2 oz (51.3 kg)   12/20/17 121 lb (54.9 kg)   09/20/17 121 lb 9.6 oz (55.2 kg)   BMI 25  No appetite  Admits to drinking beer daily    Has been coughing-some phlegm    No longer smoking-since age 25, stopped 2009    Lab Results   Component Value Date/Time    Cholesterol, total 192 06/06/2018 10:56 AM    HDL Cholesterol 63 (H) 06/06/2018 10:56 AM    LDL, calculated 99.4 06/06/2018 10:56 AM    VLDL, calculated 29.6 06/06/2018 10:56 AM    Triglyceride 148 06/06/2018 10:56 AM    CHOL/HDL Ratio 3.0 06/06/2018 10:56 AM   Not on statin    ROS:    Pt denies: Wt loss, Fever/Chills, HA, Visual changes, Fatigue, Chest pain, SOB, WORKMAN, Abd pain, N/V/D/C, Blood in stool or urine, Edema. Pertinent positive as above in HPI. All others were negative    Patient Active Problem List   Diagnosis Code    Spina bifida 26    Hyperlipidemia E78.5    Anemia D64.9    Osteoporosis M81.0    Bilateral hearing loss H91.93       Past Medical History:   Diagnosis Date    Arthritis     Chronic pain     GERD (gastroesophageal reflux disease)        Current Outpatient Prescriptions   Medication Sig Dispense Refill    calcium-cholecalciferol, D3, (CALTRATE 600+D) tablet Take 1 Tab by mouth daily.                 History   Smoking Status    Passive Smoke Exposure - Never Smoker    Packs/day: 0.00    Types: Cigarettes    Last attempt to quit: 11/3/2010   Smokeless Tobacco    Never Used       No Known Allergies    Patient Labs were reviewed: yes      Patient Past Records were reviewed:  yes        Objective:     Vitals:    06/06/18 1001   BP: 152/77   Pulse: 87   Resp: 18   Temp: 98.8 °F (37.1 °C)   TempSrc: Oral   SpO2: 98%   Weight: 113 lb 3.2 oz (51.3 kg)   Height: 4' 8\" (1.422 m)     Body mass index is 25.38 kg/(m^2). Exam:   Appearance: alert, well appearing,  oriented to person, place, and time, acyanotic, in no respiratory distress and well hydrated. HEENT:  NC/AT, pink conj, anicteric sclerae  Neck:  No cervical lymphadenopathy, no JVD, no thyromegaly, no carotid bruit  Heart:  RRR without M/R/G  Lungs:  CTAB, no rhonchi, rales, or wheezes with good air exchange   Abdomen:  Non-tender, pos bowel sounds, no hepatosplenomegaly  Ext:  No C/C/E    Skin: no rash  Neuro: no lateralizing signs, CNs II-XII intact      Assessment/ Plan:   Diagnoses and all orders for this visit:    1. Essential hypertension-uncontrolled, will start Norvasc  -     CBC WITH AUTOMATED DIFF; Future  -     METABOLIC PANEL, COMPREHENSIVE; Future  -     amLODIPine (NORVASC) 5 mg tablet; Take 1 Tab by mouth daily. 2. Hyperlipidemia, unspecified hyperlipidemia type-low cholesterol diet  -     LIPID PANEL; Future    3. Chronic cough-may need PFTs/CT if this persists  -     XR CHEST PA LAT; Future    4. Weight loss-check TSH/strong family hx of hyperthyroidism    Follow-up Disposition:  Return in about 3 months (around 9/6/2018) for follow up. I have discussed the diagnosis with the patient and the intended plan as seen in the above orders. The patient has received an After-Visit Summary and questions were answered concerning future plans. Medication Side Effects and Warnings were discussed with patient: yes    Patient verbalized understanding of above instructions.     Mariola Chapman MD  Internal Medicine  [facility]

## 2018-06-06 NOTE — MR AVS SNAPSHOT
Ernesto Gilbert Lima [address] [facility] [place]. [address] [address] 
[phone] Patient: Syed Covarrubias MRN: [id number] Visit Information Date & Time Provider Department Dept. Phone Encounter #  
 6/6/2018  9:45 AM Carmen Beard MD Israel Hassan [id number] Follow-up Instructions Return in about 3 months (around 9/6/2018) for follow up. Your Appointments 6/7/2018  2:00 PM  
SAME DAY with MD Armand Mejia 23 ([facility]) Appt Note: abnormal weight loss/cough; .  
 [facility] [place]. [address]  
  
   
 [address]  [address] Upcoming Health Maintenance Date Due DTaP/Tdap/Td series (1 - Tdap) 8/16/1954 ZOSTER VACCINE AGE 60> 6/16/1993 GLAUCOMA SCREENING Q2Y 8/16/1998 Bone Densitometry (Dexa) Screening 8/16/1998 Pneumococcal 65+ Low/Medium Risk (2 of 2 - PPSV23) 11/3/2016 Influenza Age 5 to Adult 8/1/2018 Allergies as of 6/6/2018  Review Complete On: 6/6/2018 By: Carmen Beard MD  
 No Known Allergies Current Immunizations  Reviewed on 12/20/2017 Name Date Influenza High Dose Vaccine PF 9/20/2017 ZZZ-RETIRED (DO NOT USE) Pneumococcal Vaccine (Unspecified Type) 11/3/2011 Not reviewed this visit You Were Diagnosed With   
  
 Codes Comments Essential hypertension    -  Primary ICD-10-CM: I10 
ICD-9-CM: 401.9 Hyperlipidemia, unspecified hyperlipidemia type     ICD-10-CM: E78.5 ICD-9-CM: 272.4 Chronic cough     ICD-10-CM: R05 ICD-9-CM: 403. 2 Vitals BP Pulse Temp Resp Height(growth percentile) Weight(growth percentile) 152/77 87 98.8 °F (37.1 °C) (Oral) 18 4' 8\" (1.422 m) 113 lb 3.2 oz (51.3 kg) SpO2 BMI OB Status Smoking Status 98% 25.38 kg/m2 Postmenopausal Passive Smoke Exposure - Never Smoker Vitals History BMI and BSA Data Body Mass Index Body Surface Area  
 25.38 kg/m 2 1.42 m 2 Preferred Pharmacy Pharmacy Name Phone [facility]/PHARMACY #[id number] Loving [phone] Pizarro [phone] Your Updated Medication List  
  
   
This list is accurate as of 6/6/18 10:54 AM.  Always use your most recent med list. amLODIPine 5 mg tablet Commonly known as:  Sundra Cassatt Take 1 Tab by mouth daily. calcium-cholecalciferol (D3) tablet Commonly known as:  CALTRATE 600+D Take 1 Tab by mouth daily. Prescriptions Sent to Pharmacy Refills  
 amLODIPine (NORVASC) 5 mg tablet 1 Sig: Take 1 Tab by mouth daily. Class: Normal  
 Pharmacy: [facility]/pharmacy #[id number]- Zafar Walters [phone]  #: [phone] Route: Oral  
  
Follow-up Instructions Return in about 3 months (around 9/6/2018) for follow up. To-Do List   
 06/06/2018 Lab:  CBC WITH AUTOMATED DIFF   
  
 06/06/2018 Lab:  LIPID PANEL   
  
 06/06/2018 Lab:  METABOLIC PANEL, COMPREHENSIVE   
  
 06/20/2018 Imaging:  XR CHEST PA LAT Patient Instructions DASH Diet: Care Instructions Your Care Instructions The DASH diet is an eating plan that can help lower your blood pressure. DASH stands for Dietary Approaches to Stop Hypertension. Hypertension is high blood pressure. The DASH diet focuses on eating foods that are high in calcium, potassium, and magnesium. These nutrients can lower blood pressure. The foods that are highest in these nutrients are fruits, vegetables, low-fat dairy products, nuts, seeds, and legumes. But taking calcium, potassium, and magnesium supplements instead of eating foods that are high in those nutrients does not have the same effect. The DASH diet also includes whole grains, fish, and poultry.  
The DASH diet is one of several lifestyle changes your doctor may recommend to lower your high blood pressure. Your doctor may also want you to decrease the amount of sodium in your diet. Lowering sodium while following the DASH diet can lower blood pressure even further than just the DASH diet alone. Follow-up care is a key part of your treatment and safety. Be sure to make and go to all appointments, and call your doctor if you are having problems. It's also a good idea to know your test results and keep a list of the medicines you take. How can you care for yourself at home? Following the DASH diet · Eat 4 to 5 servings of fruit each day. A serving is 1 medium-sized piece of fruit, ½ cup chopped or canned fruit, 1/4 cup dried fruit, or 4 ounces (½ cup) of fruit juice. Choose fruit more often than fruit juice. · Eat 4 to 5 servings of vegetables each day. A serving is 1 cup of lettuce or raw leafy vegetables, ½ cup of chopped or cooked vegetables, or 4 ounces (½ cup) of vegetable juice. Choose vegetables more often than vegetable juice. · Get 2 to 3 servings of low-fat and fat-free dairy each day. A serving is 8 ounces of milk, 1 cup of yogurt, or 1 ½ ounces of cheese. · Eat 6 to 8 servings of grains each day. A serving is 1 slice of bread, 1 ounce of dry cereal, or ½ cup of cooked rice, pasta, or cooked cereal. Try to choose whole-grain products as much as possible. · Limit lean meat, poultry, and fish to 2 servings each day. A serving is 3 ounces, about the size of a deck of cards. · Eat 4 to 5 servings of nuts, seeds, and legumes (cooked dried beans, lentils, and split peas) each week. A serving is 1/3 cup of nuts, 2 tablespoons of seeds, or ½ cup of cooked beans or peas. · Limit fats and oils to 2 to 3 servings each day. A serving is 1 teaspoon of vegetable oil or 2 tablespoons of salad dressing. · Limit sweets and added sugars to 5 servings or less a week. A serving is 1 tablespoon jelly or jam, ½ cup sorbet, or 1 cup of lemonade. · Eat less than 2,300 milligrams (mg) of sodium a day. If you limit your sodium to 1,500 mg a day, you can lower your blood pressure even more. Tips for success · Start small. Do not try to make dramatic changes to your diet all at once. You might feel that you are missing out on your favorite foods and then be more likely to not follow the plan. Make small changes, and stick with them. Once those changes become habit, add a few more changes. · Try some of the following: ¨ Make it a goal to eat a fruit or vegetable at every meal and at snacks. This will make it easy to get the recommended amount of fruits and vegetables each day. ¨ Try yogurt topped with fruit and nuts for a snack or healthy dessert. ¨ Add lettuce, tomato, cucumber, and onion to sandwiches. ¨ Combine a ready-made pizza crust with low-fat mozzarella cheese and lots of vegetable toppings. Try using tomatoes, squash, spinach, broccoli, carrots, cauliflower, and onions. ¨ Have a variety of cut-up vegetables with a low-fat dip as an appetizer instead of chips and dip. ¨ Sprinkle sunflower seeds or chopped almonds over salads. Or try adding chopped walnuts or almonds to cooked vegetables. ¨ Try some vegetarian meals using beans and peas. Add garbanzo or kidney beans to salads. Make burritos and tacos with mashed perez beans or black beans. Where can you learn more? Go to http://tiffani-emre.info/. Enter D586 in the search box to learn more about \"DASH Diet: Care Instructions. \" Current as of: September 21, 2016 Content Version: 11.4 © 6808-8497 Optimus. Care instructions adapted under license by Crossfader (which disclaims liability or warranty for this information). If you have questions about a medical condition or this instruction, always ask your healthcare professional. [address] any warranty or liability for your use of this information. Introducing [facility] & HEALTH SERVICES! Dear Lisa Peter: Thank you for requesting a The Lux[employer]oset account. Our records indicate that you already have an active [employer] account. You can access your account anytime at [email or web address] Did you know that you can access your hospital and ER discharge instructions at any time in The Luxury Closet? You can also review all of your test results from your hospital stay or ER visit. Additional Information If you have questions, please visit the Frequently Asked Questions section of the [employer] website at [email or web address] Remember, The Lux[employer] Closet is NOT to be used for urgent needs. For medical emergencies, dial 911. Now available from your iPhone and Android! Please provide this summary of care documentation to your next provider. Your primary care clinician is listed as [facility]. If you have any questions after today's visit, please call [phone].

## 2018-06-07 NOTE — PROGRESS NOTES
Rafia Obrien, pls let her know labs were normal except for mild anemia-we can recheck this next visit

## 2018-10-03 ENCOUNTER — HOSPITAL ENCOUNTER (OUTPATIENT)
Dept: LAB | Age: 83
Discharge: HOME | End: 2018-10-03
Payer: MEDICAID

## 2018-10-03 ENCOUNTER — OFFICE VISIT (OUTPATIENT)
Dept: FAMILY MEDICINE CLINIC | Age: 83
End: 2018-10-03

## 2018-10-03 VITALS
DIASTOLIC BLOOD PRESSURE: 73 MMHG | RESPIRATION RATE: 15 BRPM | BODY MASS INDEX: 25.33 KG/M2 | SYSTOLIC BLOOD PRESSURE: 145 MMHG | WEIGHT: 112.6 LBS | HEART RATE: 82 BPM | OXYGEN SATURATION: 97 % | HEIGHT: 56 IN | TEMPERATURE: 98 F

## 2018-10-03 DIAGNOSIS — R63.4 WEIGHT LOSS, NON-INTENTIONAL: ICD-10-CM

## 2018-10-03 DIAGNOSIS — R07.9 CHEST PAIN, UNSPECIFIED TYPE: ICD-10-CM

## 2018-10-03 DIAGNOSIS — E66.3 OVERWEIGHT (BMI 25.0-29.9): ICD-10-CM

## 2018-10-03 DIAGNOSIS — R49.0 VOICE HOARSENESS: ICD-10-CM

## 2018-10-03 DIAGNOSIS — N39.0 FREQUENT UTI: ICD-10-CM

## 2018-10-03 DIAGNOSIS — I10 ESSENTIAL HYPERTENSION: Primary | ICD-10-CM

## 2018-10-03 LAB
APPEARANCE UR: CLEAR
BILIRUB UR QL: NEGATIVE
COLOR UR: YELLOW
GLUCOSE UR STRIP.AUTO-MCNC: NEGATIVE MG/DL
HGB UR QL STRIP: NEGATIVE
KETONES UR QL STRIP.AUTO: NEGATIVE MG/DL
LEUKOCYTE ESTERASE UR QL STRIP.AUTO: NEGATIVE
NITRITE UR QL STRIP.AUTO: NEGATIVE
PH UR STRIP: 7.5 [PH] (ref 5–8)
PROT UR STRIP-MCNC: NEGATIVE MG/DL
SP GR UR REFRACTOMETRY: 1.01 (ref 1–1.03)
UROBILINOGEN UR QL STRIP.AUTO: 0.2 EU/DL (ref 0.2–1)

## 2018-10-03 PROCEDURE — 87086 URINE CULTURE/COLONY COUNT: CPT | Performed by: INTERNAL MEDICINE

## 2018-10-03 PROCEDURE — 81003 URINALYSIS AUTO W/O SCOPE: CPT | Performed by: INTERNAL MEDICINE

## 2018-10-03 NOTE — PATIENT INSTRUCTIONS
Chest Pain: Care Instructions Your Care Instructions There are many things that can cause chest pain. Some are not serious and will get better on their own in a few days. But some kinds of chest pain need more testing and treatment. Your doctor may have recommended a follow-up visit in the next 8 to 12 hours. If you are not getting better, you may need more tests or treatment. Even though your doctor has released you, you still need to watch for any problems. The doctor carefully checked you, but sometimes problems can develop later. If you have new symptoms or if your symptoms do not get better, get medical care right away. If you have worse or different chest pain or pressure that lasts more than 5 minutes or you passed out (lost consciousness), call 911 or seek other emergency help right away. A medical visit is only one step in your treatment. Even if you feel better, you still need to do what your doctor recommends, such as going to all suggested follow-up appointments and taking medicines exactly as directed. This will help you recover and help prevent future problems. How can you care for yourself at home? · Rest until you feel better. · Take your medicine exactly as prescribed. Call your doctor if you think you are having a problem with your medicine. · Do not drive after taking a prescription pain medicine. When should you call for help? Call 911 if: 
  · You passed out (lost consciousness).  
  · You have severe difficulty breathing.  
  · You have symptoms of a heart attack. These may include: ¨ Chest pain or pressure, or a strange feeling in your chest. 
¨ Sweating. ¨ Shortness of breath. ¨ Nausea or vomiting. ¨ Pain, pressure, or a strange feeling in your back, neck, jaw, or upper belly or in one or both shoulders or arms. ¨ Lightheadedness or sudden weakness. ¨ A fast or irregular heartbeat.  
After you call 911, the  may tell you to chew 1 adult-strength or 2 to 4 low-dose aspirin. Wait for an ambulance. Do not try to drive yourself.  
 Call your doctor today if: 
  · You have any trouble breathing.  
  · Your chest pain gets worse.  
  · You are dizzy or lightheaded, or you feel like you may faint.  
  · You are not getting better as expected.  
  · You are having new or different chest pain. Where can you learn more? Go to http://tiffani-emre.info/. Enter A120 in the search box to learn more about \"Chest Pain: Care Instructions. \" Current as of: November 20, 2017 Content Version: 11.7 © 9388-3987 Storyworks OnDemand. Care instructions adapted under license by Lumen Biomedical (which disclaims liability or warranty for this information). If you have questions about a medical condition or this instruction, always ask your healthcare professional. [address] any warranty or liability for your use of this information.

## 2018-10-03 NOTE — PROGRESS NOTES
Chief Complaint Patient presents with  Follow Up Chronic Condition Patient not fasting  Back Pain Needle-like pain on mid-back, to chest  
 
Patient declines influenza vaccine. 1. Have you been to the ER, urgent care clinic since your last visit? Hospitalized since your last visit? No 
 
2. Have you seen or consulted any other health care providers outside of the [address] [place] [address] since your last visit? Include any pap smears or colon screening.  No

## 2018-10-03 NOTE — PROGRESS NOTES
Chief Complaint Patient presents with  Follow Up Chronic Condition Patient not fasting  Back Pain Needle-like pain on mid-back, to chest  
 
 
Pt is a 80y.o. year old female who presents for follow up of her chronic medical problems Wt Readings from Last 3 Encounters:  
10/03/18 112 lb 9.6 oz (51.1 kg) 06/06/18 113 lb 3.2 oz (51.3 kg) 12/20/17 121 lb (54.9 kg) BMI 25 
 
BP Readings from Last 3 Encounters:  
10/03/18 145/73  
06/06/18 152/77  
12/20/17 163/87 On Norvasc since last visit Repeat BP-still with SBP slightly up Lab Results Component Value Date/Time Cholesterol, total 192 06/06/2018 10:56 AM  
 HDL Cholesterol 63 (H) 06/06/2018 10:56 AM  
 LDL, calculated 99.4 06/06/2018 10:56 AM  
 VLDL, calculated 29.6 06/06/2018 10:56 AM  
 Triglyceride 148 06/06/2018 10:56 AM  
 CHOL/HDL Ratio 3.0 06/06/2018 10:56 AM  
 
 
Pain on the upper back Also with chest pain -chest heaviness usually at night; resolves on its own-5 minutes Health Maintenance Due Topic Date Due  
 DTaP/Tdap/Td series (1 - Tdap) 08/16/1954  Shingrix Vaccine Age 50> (1 of 2) 08/16/1983  GLAUCOMA SCREENING Q2Y  08/16/1998  Bone Densitometry (Dexa) Screening  08/16/1998  Pneumococcal 65+ Low/Medium Risk (2 of 2 - PPSV23) 11/03/2016  Influenza Age 5 to Adult  08/01/2018-declined ROS: 
 
Pt denies: Wt loss, Fever/Chills, HA, Visual changes, Fatigue, Chest pain, SOB, WORKMAN, Abd pain, N/V/D/C, Blood in stool or urine, Edema. Pertinent positive as above in HPI. All others were negative Patient Active Problem List  
Diagnosis Code  Spina bifida 26  Hyperlipidemia E78.5  Anemia D64.9  
 Osteoporosis M81.0  Bilateral hearing loss H91.93  
 Overweight (BMI 25.0-29. 9) E66.3 Past Medical History:  
Diagnosis Date  Arthritis  Chronic pain  GERD (gastroesophageal reflux disease) Current Outpatient Prescriptions Medication Sig Dispense Refill  calcium-cholecalciferol, D3, (CALTRATE 600+D) tablet Take 1 Tab by mouth daily.  amLODIPine (NORVASC) 5 mg tablet Take 1 Tab by mouth daily. 90 Tab 1 History Smoking Status  Passive Smoke Exposure - Never Smoker  Packs/day: 0.00  Types: Cigarettes  Last attempt to quit: 11/3/2010 Smokeless Tobacco  
 Never Used No Known Allergies Patient Labs were reviewed: yes Patient Past Records were reviewed:  yes Objective:  
 
Vitals:  
 10/03/18 1145 BP: 145/73 Pulse: 82 Resp: 15 Temp: 98 °F (36.7 °C) TempSrc: Oral  
SpO2: 97% Weight: 112 lb 9.6 oz (51.1 kg) Height: 4' 8\" (1.422 m) Body mass index is 25.24 kg/(m^2). Exam:  
Appearance: alert, well appearing,  oriented to person, place, and time, acyanotic, in no respiratory distress and well hydrated. HEENT:  NC/AT, pink conj, anicteric sclerae Neck:  No cervical lymphadenopathy, no JVD, no thyromegaly, no carotid bruit Heart:  RRR without M/R/G Lungs:  CTAB, no rhonchi, rales, or wheezes with good air exchange Abdomen:  Non-tender, pos bowel sounds, no hepatosplenomegaly, no CVA tenderness Ext:  No C/C/E Skin: no rash Neuro: no lateralizing signs, CNs II-XII intact Back: no reproducible pain EKG: NSR, no LVH, no ST-T wave changes Assessment/ Plan:  
Diagnoses and all orders for this visit: 1. Essential hypertension-sBP slightly high, advised low sodium diet and continue with current med; will increase med next visit if still elevated -     AMB POC EKG ROUTINE W/ 12 LEADS, INTER & REP 2. Chest pain, unspecified type-etio? Since EKG was normal and she was a former smoker, will order chest CT 
-     CT CHEST WO CONT; Future 3. Weight loss, non-intentional-as above -     CT CHEST WO CONT; Future 4. Overweight (BMI 25.0-29. 9)-discussed limiting caroline to 8739-3207/day and exercising at least 150 min a week 5. Frequent UTI-advised bladder training 
-     CULTURE, URINE; Future -     URINALYSIS W/ RFLX MICROSCOPIC; Future 6. Voice hoarseness-likely viral pharyngitis, reassured Follow-up Disposition: 
Return in about 3 months (around 1/3/2019) for follow up. I have discussed the diagnosis with the patient and the intended plan as seen in the above orders. The patient has received an After-Visit Summary and questions were answered concerning future plans. Medication Side Effects and Warnings were discussed with patient: yes Patient verbalized understanding of above instructions. Lois Arias MD 
Internal Medicine [address] [facility]

## 2018-10-03 NOTE — MR AVS SNAPSHOT
Ernesto Gilbert [place] [address] [facility] [place]. [address] [facility] [address] [address] 
[phone] Patient: Victor Manuel Contreras MRN: [id number]:[id number] Visit Information Date & Time Provider Department Dept. Phone Encounter #  
 10/3/2018 11:45 AM [facility] [id number] Follow-up Instructions Return in about 3 months (around 1/3/2019) for follow up. Upcoming Health Maintenance Date Due DTaP/Tdap/Td series (1 - Tdap) 8/16/1954 Shingrix Vaccine Age 50> (1 of 2) 8/16/1983 GLAUCOMA SCREENING Q2Y 8/16/1998 Bone Densitometry (Dexa) Screening 8/16/1998 Pneumococcal 65+ Low/Medium Risk (2 of 2 - PPSV23) 11/3/2016 Influenza Age 5 to Adult 10/3/2019* *Topic was postponed. The date shown is not the original due date. Allergies as of 10/3/2018  Review Complete On: 10/3/2018 By: Esa Wade MD  
 No Known Allergies Current Immunizations  Reviewed on 12/20/2017 Name Date Influenza High Dose Vaccine PF 9/20/2017 ZZZ-RETIRED (DO NOT USE) Pneumococcal Vaccine (Unspecified Type) 11/3/2011 Not reviewed this visit You Were Diagnosed With   
  
 Codes Comments Essential hypertension    -  Primary ICD-10-CM: I10 
ICD-9-CM: 401.9 Chest pain, unspecified type     ICD-10-CM: R07.9 ICD-9-CM: 786.50 Weight loss, non-intentional     ICD-10-CM: R63.4 ICD-9-CM: 783.21 Overweight (BMI 25.0-29. 9)     ICD-10-CM: O60.8 ICD-9-CM: 278.02 Vitals BP Pulse Temp Resp Height(growth percentile) Weight(growth percentile) 145/73 82 98 °F (36.7 °C) (Oral) 15 4' 8\" (1.422 m) 112 lb 9.6 oz (51.1 kg) SpO2 BMI OB Status Smoking Status 97% 25.24 kg/m2 Postmenopausal Passive Smoke Exposure - Never Smoker Vitals History BMI and BSA Data Body Mass Index Body Surface Area  
 25.24 kg/m 2 1.42 m 2 Preferred Pharmacy Pharmacy Name Phone [facility]/PHARMACY #[id number] Zafar Rollins [phone]  [phone] Your Updated Medication List  
  
   
This list is accurate as of 10/3/18 12:33 PM.  Always use your most recent med list. amLODIPine 5 mg tablet Commonly known as:  Herschell Saras Take 1 Tab by mouth daily. calcium-cholecalciferol (D3) tablet Commonly known as:  CALTRATE 600+D Take 1 Tab by mouth daily. We Performed the Following AMB POC EKG ROUTINE W/ 12 LEADS, INTER & REP [96328 CPT(R)] Follow-up Instructions Return in about 3 months (around 1/3/2019) for follow up. To-Do List   
 11/03/2018 Imaging:  CT CHEST WO CONT Referral Information Referral ID Referred By Referred To  
  
 [id number] Nesha Colunga Not Available Visits Status Start Date End Date 1 New Request 10/3/18 10/3/19 If your referral has a status of pending review or denied, additional information will be sent to support the outcome of this decision. Patient Instructions Chest Pain: Care Instructions Your Care Instructions There are many things that can cause chest pain. Some are not serious and will get better on their own in a few days. But some kinds of chest pain need more testing and treatment. Your doctor may have recommended a follow-up visit in the next 8 to 12 hours. If you are not getting better, you may need more tests or treatment. Even though your doctor has released you, you still need to watch for any problems. The doctor carefully checked you, but sometimes problems can develop later. If you have new symptoms or if your symptoms do not get better, get medical care right away. If you have worse or different chest pain or pressure that lasts more than 5 minutes or you passed out (lost consciousness), call 911 or seek other emergency help right away. A medical visit is only one step in your treatment.  Even if you feel better, you still need to do what your doctor recommends, such as going to all suggested follow-up appointments and taking medicines exactly as directed. This will help you recover and help prevent future problems. How can you care for yourself at home? · Rest until you feel better. · Take your medicine exactly as prescribed. Call your doctor if you think you are having a problem with your medicine. · Do not drive after taking a prescription pain medicine. When should you call for help? Call 911 if: 
  · You passed out (lost consciousness).  
  · You have severe difficulty breathing.  
  · You have symptoms of a heart attack. These may include: ¨ Chest pain or pressure, or a strange feeling in your chest. 
¨ Sweating. ¨ Shortness of breath. ¨ Nausea or vomiting. ¨ Pain, pressure, or a strange feeling in your back, neck, jaw, or upper belly or in one or both shoulders or arms. ¨ Lightheadedness or sudden weakness. ¨ A fast or irregular heartbeat. After you call 911, the  may tell you to chew 1 adult-strength or 2 to 4 low-dose aspirin. Wait for an ambulance. Do not try to drive yourself.  
 Call your doctor today if: 
  · You have any trouble breathing.  
  · Your chest pain gets worse.  
  · You are dizzy or lightheaded, or you feel like you may faint.  
  · You are not getting better as expected.  
  · You are having new or different chest pain. Where can you learn more? Go to http://tiffani-emre.info/. Enter A120 in the search box to learn more about \"Chest Pain: Care Instructions. \" Current as of: November 20, 2017 Content Version: 11.7 © 3614-7170 Avokia. Care instructions adapted under license by iDoneThis (which disclaims liability or warranty for this information).  If you have questions about a medical condition or this instruction, always ask your healthcare professional. Chacha Albarran[employer]orporated disclaims any warranty or liability for your use of this information. Introducing [facility] & HEALTH SERVICES! Dear Joleen Chamorro: Thank you for requesting a [employer] account. Our records indicate that you already have an active [employer] account. You can access your account anytime at [email or web address] Did you know that you can access your hospital and ER discharge instructions at any time in [employer]? You can also review all of your test results from your hospital stay or ER visit. Additional Information If you have questions, please visit the Frequently Asked Questions section of the [employer] website at [email or web address] Remember, [employer] is NOT to be used for urgent needs. For medical emergencies, dial 911. Now available from your iPhone and Android! Please provide this summary of care documentation to your next provider. Your primary care clinician is listed as Radu Grier. If you have any questions after today's visit, please call [phone].

## 2018-10-05 LAB
BACTERIA SPEC CULT: NORMAL
SERVICE CMNT-IMP: NORMAL

## 2018-12-09 DIAGNOSIS — I10 ESSENTIAL HYPERTENSION: ICD-10-CM

## 2018-12-09 RX ORDER — AMLODIPINE BESYLATE 5 MG/1
TABLET ORAL
Qty: 90 TAB | Refills: 1 | Status: SHIPPED | OUTPATIENT
Start: 2018-12-09 | End: 2019-06-09 | Stop reason: SDUPTHER

## 2019-03-27 ENCOUNTER — HOSPITAL ENCOUNTER (OUTPATIENT)
Dept: LAB | Age: 84
Discharge: HOME | End: 2019-03-27
Payer: MEDICAID

## 2019-03-27 ENCOUNTER — OFFICE VISIT (OUTPATIENT)
Dept: FAMILY MEDICINE CLINIC | Age: 84
End: 2019-03-27

## 2019-03-27 VITALS
TEMPERATURE: 96.9 F | OXYGEN SATURATION: 97 % | WEIGHT: 117 LBS | DIASTOLIC BLOOD PRESSURE: 62 MMHG | RESPIRATION RATE: 18 BRPM | HEIGHT: 56 IN | HEART RATE: 83 BPM | BODY MASS INDEX: 26.32 KG/M2 | SYSTOLIC BLOOD PRESSURE: 133 MMHG

## 2019-03-27 DIAGNOSIS — I10 ESSENTIAL HYPERTENSION: ICD-10-CM

## 2019-03-27 DIAGNOSIS — R06.09 DOE (DYSPNEA ON EXERTION): ICD-10-CM

## 2019-03-27 DIAGNOSIS — R06.09 DOE (DYSPNEA ON EXERTION): Primary | ICD-10-CM

## 2019-03-27 DIAGNOSIS — Z23 NEED FOR VACCINATION WITH 13-POLYVALENT PNEUMOCOCCAL CONJUGATE VACCINE: ICD-10-CM

## 2019-03-27 DIAGNOSIS — D64.9 ANEMIA, UNSPECIFIED TYPE: ICD-10-CM

## 2019-03-27 DIAGNOSIS — E66.3 OVERWEIGHT (BMI 25.0-29.9): ICD-10-CM

## 2019-03-27 LAB
ALBUMIN SERPL-MCNC: 4 G/DL (ref 3.4–5)
ALBUMIN/GLOB SERPL: 1.2 {RATIO} (ref 0.8–1.7)
ALP SERPL-CCNC: 83 U/L (ref 45–117)
ALT SERPL-CCNC: 27 U/L (ref 13–56)
ANION GAP SERPL CALC-SCNC: 11 MMOL/L (ref 3–18)
AST SERPL-CCNC: 22 U/L (ref 15–37)
BASOPHILS # BLD: 0 K/UL (ref 0–0.1)
BASOPHILS NFR BLD: 1 % (ref 0–2)
BILIRUB SERPL-MCNC: 0.6 MG/DL (ref 0.2–1)
BNP SERPL-MCNC: 121 PG/ML (ref 0–1800)
BUN SERPL-MCNC: 9 MG/DL (ref 7–18)
BUN/CREAT SERPL: 14 (ref 12–20)
CALCIUM SERPL-MCNC: 8.7 MG/DL (ref 8.5–10.1)
CHLORIDE SERPL-SCNC: 106 MMOL/L (ref 100–108)
CHOLEST SERPL-MCNC: 243 MG/DL
CO2 SERPL-SCNC: 25 MMOL/L (ref 21–32)
CREAT SERPL-MCNC: 0.65 MG/DL (ref 0.6–1.3)
DIFFERENTIAL METHOD BLD: ABNORMAL
EOSINOPHIL # BLD: 0.1 K/UL (ref 0–0.4)
EOSINOPHIL NFR BLD: 1 % (ref 0–5)
ERYTHROCYTE [DISTWIDTH] IN BLOOD BY AUTOMATED COUNT: 15.5 % (ref 11.6–14.5)
GLOBULIN SER CALC-MCNC: 3.4 G/DL (ref 2–4)
GLUCOSE SERPL-MCNC: 96 MG/DL (ref 74–99)
HCT VFR BLD AUTO: 38.5 % (ref 35–45)
HDLC SERPL-MCNC: 71 MG/DL (ref 40–60)
HDLC SERPL: 3.4 {RATIO} (ref 0–5)
HGB BLD-MCNC: 12.3 G/DL (ref 12–16)
LDLC SERPL CALC-MCNC: 129.2 MG/DL (ref 0–100)
LIPID PROFILE,FLP: ABNORMAL
LYMPHOCYTES # BLD: 1.9 K/UL (ref 0.9–3.6)
LYMPHOCYTES NFR BLD: 39 % (ref 21–52)
MCH RBC QN AUTO: 22.7 PG (ref 24–34)
MCHC RBC AUTO-ENTMCNC: 31.9 G/DL (ref 31–37)
MCV RBC AUTO: 71 FL (ref 74–97)
MONOCYTES # BLD: 0.4 K/UL (ref 0.05–1.2)
MONOCYTES NFR BLD: 8 % (ref 3–10)
NEUTS SEG # BLD: 2.4 K/UL (ref 1.8–8)
NEUTS SEG NFR BLD: 51 % (ref 40–73)
PLATELET # BLD AUTO: 268 K/UL (ref 135–420)
PMV BLD AUTO: 10.2 FL (ref 9.2–11.8)
POTASSIUM SERPL-SCNC: 3.6 MMOL/L (ref 3.5–5.5)
PROT SERPL-MCNC: 7.4 G/DL (ref 6.4–8.2)
RBC # BLD AUTO: 5.42 M/UL (ref 4.2–5.3)
SODIUM SERPL-SCNC: 142 MMOL/L (ref 136–145)
TRIGL SERPL-MCNC: 214 MG/DL (ref ?–150)
VLDLC SERPL CALC-MCNC: 42.8 MG/DL
WBC # BLD AUTO: 4.8 K/UL (ref 4.6–13.2)

## 2019-03-27 PROCEDURE — 83880 ASSAY OF NATRIURETIC PEPTIDE: CPT

## 2019-03-27 PROCEDURE — 85025 COMPLETE CBC W/AUTO DIFF WBC: CPT

## 2019-03-27 PROCEDURE — 80053 COMPREHEN METABOLIC PANEL: CPT

## 2019-03-27 PROCEDURE — 80061 LIPID PANEL: CPT

## 2019-03-27 PROCEDURE — 36415 COLL VENOUS BLD VENIPUNCTURE: CPT

## 2019-03-27 NOTE — PATIENT INSTRUCTIONS
Pneumococcal Conjugate Vaccine (PCV13): What You Need to Know Why get vaccinated? Vaccination can protect both children and adults from pneumococcal disease. Pneumococcal disease is caused by bacteria that can spread from person to person through close contact. It can cause ear infections, and it can also lead to more serious infections of the: 
· Lungs (pneumonia). · Blood (bacteremia). · Covering of the brain and spinal cord (meningitis). Pneumococcal pneumonia is most common among adults. Pneumococcal meningitis can cause deafness and brain damage, and it kills about 1 child in 10 who get it. Anyone can get pneumococcal disease, but children under 3years of age and adults 72 years and older, people with certain medical conditions, and cigarette smokers are at the highest risk. Before there was a vaccine, the [facility] saw the following in children under 5 each year from pneumococcal disease: · More than 700 cases of meningitis · About 13,000 blood infections · About 5 million ear infections · About 200 deaths Since the vaccine became available, severe pneumococcal disease in these children has fallen by 88%. About 18,000 older adults die of pneumococcal disease each year in the [place]. Treatment of pneumococcal infections with penicillin and other drugs is not as effective as it used to be, because some strains of the disease have become resistant to these drugs. This makes prevention of the disease through vaccination even more important. PCV13 vaccine Pneumococcal conjugate vaccine (called PCV13) protects against 13 types of pneumococcal bacteria. PCV13 is routinely given to children at 2, 4, 6, and 1515 months of age. It is also recommended for children and adults 3to 59years of age with certain health conditions, and for all adults 72years of age and older. Your doctor can give you details. Some people should not get this vaccine Anyone who has ever had a life-threatening allergic reaction to a dose of this vaccine, to an earlier pneumococcal vaccine called PCV7, or to any vaccine containing diphtheria toxoid (for example, DTaP), should not get PCV13. Anyone with a severe allergy to any component of PCV13 should not get the vaccine. Tell your doctor if the person being vaccinated has any severe allergies. If the person scheduled for vaccination is not feeling well, your healthcare provider might decide to reschedule the shot on another day. Risks of a vaccine reaction With any medicine, including vaccines, there is a chance of reactions. These are usually mild and go away on their own, but serious reactions are also possible. Problems reported following PCV13 varied by age and dose in the series. The most common problems reported among children were: · About half became drowsy after the shot, had a temporary loss of appetite, or had redness or tenderness where the shot was given. · About 1 out of 3 had swelling where the shot was given. · About 1 out of 3 had a mild fever, and about 1 in 20 had a fever over 102.2°F. 
· Up to about 8 out of 10 became fussy or irritable. Adults have reported pain, redness, and swelling where the shot was given; also mild fever, fatigue, headache, chills, or muscle pain. Anna Leak children who get PCV13 along with inactivated flu vaccine at the same time may be at increased risk for seizures caused by fever. Ask your doctor for more information. Problems that could happen after any vaccine: · People sometimes faint after a medical procedure, including vaccination. Sitting or lying down for about 15 minutes can help prevent fainting and the injuries caused by a fall. Tell your doctor if you feel dizzy or have vision changes or ringing in the ears. · Some older children and adults get severe pain in the shoulder and have difficulty moving the arm where a shot was given. This happens very rarely. · Any medication can cause a severe allergic reaction. Such reactions from a vaccine are very rare, estimated at about 1 in a million doses, and would happen within a few minutes to a few hours after the vaccination. As with any medicine, there is a very small chance of a vaccine causing a serious injury or death. The safety of vaccines is always being monitored. For more information, visit: www.cdc.gov/vaccinesafety. What if there is a serious reaction? What should I look for? · Look for anything that concerns you, such as signs of a severe allergic reaction, very high fever, or unusual behavior. Signs of a severe allergic reaction can include hives, swelling of the face and throat, difficulty breathing, a fast heartbeat, dizziness, and weakness, usually within a few minutes to a few hours after the vaccination. What should I do? · If you think it is a severe allergic reaction or other emergency that can't wait, call 911 or get the person to the nearest hospital. Otherwise, call your doctor. · Reactions should be reported to the Vaccine Adverse Event Reporting System (VAERS). Your doctor should file this report, or you can do it yourself through the VAERS website at www.vaers. [facility].gov, or by calling 0-135.882.1913. VAERS does not give medical advice. The National Vaccine Injury Compensation Program 
The National Vaccine Injury Compensation Program (VICP) is a federal program that was created to compensate people who may have been injured by certain vaccines. Persons who believe they may have been injured by a vaccine can learn about the program and about filing a claim by calling 5-976.423.5035 or visiting the [employer] website at www.[facility].gov/vaccinecompensation. There is a time limit to file a claim for compensation. How can I learn more? · Ask your healthcare provider. He or she can give you the vaccine package insert or suggest other sources of information. · Call your local or state health department. · Contact the Centers for Disease Control and Prevention (CDC): 
? Call 3-899.178.3216 (1-800-CDC-INFO) or 
? Visit CDC's website at www.cdc.gov/vaccines Vaccine Information Statement PCV13 Vaccine 11/5/2015 
42 BALWINDER Gilbert [id number] Department of [facility] and [employer] Centers for Disease Control and Prevention Many Vaccine Information Statements are available in [place] and other languages. See www.immunize.org/vis. Muchas hojas de información sobre vacunas están disponibles en español y en otros idiomas. Visite www.immunize.org/vis. Care instructions adapted under license by [employer] (which disclaims liability or warranty for this information). If you have questions about a medical condition or this instruction, always ask your healthcare professional. [address] any warranty or liability for your use of this information.

## 2019-03-27 NOTE — PROGRESS NOTES
Chief Complaint Patient presents with  Follow Up Chronic Condition HTN; patient not fasting  Back Pain  Fatigue  
  when going up stairs, but not with walking distances 1. Have you been to the ER, urgent care clinic since your last visit? Hospitalized since your last visit? No 
 
2. Have you seen or consulted any other health care providers outside of the [address] [place] [address] since your last visit? Include any pap smears or colon screening.  No

## 2019-03-27 NOTE — PROGRESS NOTES
Chief Complaint Patient presents with  Follow Up Chronic Condition HTN; patient not fasting  Back Pain  Fatigue  
  when going up stairs, but not with walking distances Pt is a 80y.o. year old female who presents for follow up of her chronic medical problems BP Readings from Last 3 Encounters:  
03/27/19 133/62  
10/03/18 145/73  
06/06/18 152/77 On Norvasc Wt Readings from Last 3 Encounters:  
03/27/19 117 lb (53.1 kg) 10/03/18 112 lb 9.6 oz (51.1 kg) 06/06/18 113 lb 3.2 oz (51.3 kg) BMI 26 
 
1 week of WORKMAN going up steps but not walking Drinks 1 beer a day Lab Results Component Value Date/Time Cholesterol, total 192 06/06/2018 10:56 AM  
 HDL Cholesterol 63 (H) 06/06/2018 10:56 AM  
 LDL, calculated 99.4 06/06/2018 10:56 AM  
 VLDL, calculated 29.6 06/06/2018 10:56 AM  
 Triglyceride 148 06/06/2018 10:56 AM  
 CHOL/HDL Ratio 3.0 06/06/2018 10:56 AM  
 
 
ROS: 
 
Pt denies: Wt loss, Fever/Chills, HA, Visual changes, Fatigue, Chest pain, SOB, WORKMAN, Abd pain, N/V/D/C, Blood in stool or urine, Edema. Pertinent positive as above in HPI. All others were negative Patient Active Problem List  
Diagnosis Code  Spina bifida 26  Hyperlipidemia E78.5  Anemia D64.9  
 Osteoporosis M81.0  Bilateral hearing loss H91.93  
 Overweight (BMI 25.0-29. 9) E66.3 Past Medical History:  
Diagnosis Date  Arthritis  Chronic pain  GERD (gastroesophageal reflux disease) Current Outpatient Medications Medication Sig Dispense Refill  amLODIPine (NORVASC) 5 mg tablet TAKE 1 TABLET BY MOUTH EVERY DAY 90 Tab 1  calcium-cholecalciferol, D3, (CALTRATE 600+D) tablet Take 1 Tab by mouth daily. Social History Tobacco Use Smoking Status Passive Smoke Exposure - Never Smoker Smokeless Tobacco Never Used No Known Allergies Patient Labs were reviewed: yes Patient Past Records were reviewed:  yes Objective:  
 
Vitals: 03/27/19 1100 BP: 133/62 Pulse: 83 Resp: 18 Temp: 96.9 °F (36.1 °C) TempSrc: Oral  
SpO2: 97% Weight: 117 lb (53.1 kg) Height: 4' 8\" (1.422 m) Body mass index is 26.23 kg/m². Exam:  
Appearance: alert, well appearing,  oriented to person, place, and time, acyanotic, in no respiratory distress and well hydrated. HEENT:  NC/AT, pink conj, anicteric sclerae Neck:  No cervical lymphadenopathy, no JVD, no thyromegaly, no carotid bruit Heart:  RRR without M/R/G Lungs:  CTAB, no rhonchi, rales, or wheezes with good air exchange Abdomen:  Non-tender, pos bowel sounds, no hepatosplenomegaly Ext:  No C/C/E Skin: no rash Neuro: no lateralizing signs, CNs II-XII intact Assessment/ Plan:  
Diagnoses and all orders for this visit: 
 
1. WORKMAN (dyspnea on exertion)-etio? If echo is normal, may need PFTs scott with hx of smoking 
-     NT-PRO BNP; Future 
-     ECHO ADULT COMPLETE; Future 2. Essential hypertension-controlled, continue with Norvasc 
-     CBC WITH AUTOMATED DIFF; Future -     METABOLIC PANEL, COMPREHENSIVE; Future -     LIPID PANEL; Future 
-     ECHO ADULT COMPLETE; Future 3. Anemia, unspecified type-advised to take MVI with iron 4. Overweight (BMI 25.0-29. 9)-continue to walk for exercise 5. Need for vaccination with 13-polyvalent pneumococcal conjugate vaccine 
-     PNEUMOCOCCAL CONJ VACCINE 13 VALENT IM Follow-up and Dispositions · Return in about 6 months (around 9/27/2019) for follow up. I have discussed the diagnosis with the patient and the intended plan as seen in the above orders. The patient has received an After-Visit Summary and questions were answered concerning future plans. Medication Side Effects and Warnings were discussed with patient: yes Patient verbalized understanding of above instructions. Ervin Tobar MD 
Internal Medicine [facility]

## 2019-04-03 NOTE — PROGRESS NOTES
pls let patient know-test for CHF was negative  cholesterol went up but HDL is elevated so just watch diet for now, recheck fasting next visit

## 2019-06-09 DIAGNOSIS — I10 ESSENTIAL HYPERTENSION: ICD-10-CM

## 2019-06-09 RX ORDER — AMLODIPINE BESYLATE 5 MG/1
TABLET ORAL
Qty: 90 TAB | Refills: 1 | Status: SHIPPED | OUTPATIENT
Start: 2019-06-09 | End: 2019-12-27

## 2020-03-26 DIAGNOSIS — I10 ESSENTIAL HYPERTENSION: ICD-10-CM

## 2020-03-26 RX ORDER — AMLODIPINE BESYLATE 5 MG/1
TABLET ORAL
Qty: 90 TAB | Refills: 0 | Status: SHIPPED | OUTPATIENT
Start: 2020-03-26 | End: 2020-06-17

## 2020-09-08 ENCOUNTER — VIRTUAL VISIT (OUTPATIENT)
Dept: FAMILY MEDICINE CLINIC | Age: 85
End: 2020-09-08

## 2020-09-08 DIAGNOSIS — I83.893 VARICOSE VEINS OF BOTH LEGS WITH EDEMA: ICD-10-CM

## 2020-09-08 DIAGNOSIS — R06.09 DOE (DYSPNEA ON EXERTION): ICD-10-CM

## 2020-09-08 DIAGNOSIS — R60.9 EDEMA, UNSPECIFIED TYPE: ICD-10-CM

## 2020-09-08 DIAGNOSIS — I10 ESSENTIAL HYPERTENSION: Primary | ICD-10-CM

## 2020-09-08 NOTE — PROGRESS NOTES
Deshawn Vizcarra is a 80 y.o. female who was seen by synchronous (real-time) audio-video technology on 9/8/2020 for Follow Up Chronic Condition (3 month)        Assessment & Plan:   Diagnoses and all orders for this visit:    1. Essential hypertension  -     CBC WITH AUTOMATED DIFF; Future  -     METABOLIC PANEL, COMPREHENSIVE; Future  -     LIPID PANEL; Future  -     ECHO ADULT COMPLETE; Future    2. WORKMAN (dyspnea on exertion)-etio? Deconditioning? Cardiac like CHF/CAD; COPD bec of smoking hx  -     XR CHEST PA LAT; Future  -     ECHO ADULT COMPLETE; Future    3. Edema, unspecified type-etio?  -     ECHO ADULT COMPLETE; Future    4. Varicose veins of both legs with edema-advised to wear pressure stockings with prolonged standng      Follow-up and Dispositions    · Return in about 6 months (around 3/8/2021) for follow up; fasting labs, flu vaccine, nurse visit for BP check and CXR on Friday. 712  Subjective:     2 weeks of bilat leg swelling, usually later in the day    BP Readings from Last 3 Encounters:   03/27/19 133/62   10/03/18 145/73   06/06/18 152/77   does not check  Takes Norvasc    Gets SOB with exertion-going up stairs mostly; since Feb when she has not gone out walking  Used to walk for exercise    Prior to Admission medications    Medication Sig Start Date End Date Taking? Authorizing Provider   amLODIPine (NORVASC) 5 mg tablet TAKE 1 TABLET BY MOUTH EVERY DAY 6/17/20  Yes Ann Boyd MD   calcium-cholecalciferol, D3, (CALTRATE 600+D) tablet Take 1 Tab by mouth daily. Yes Provider, Historical     Patient Active Problem List    Diagnosis Date Noted    Overweight (BMI 25.0-29.9) 10/03/2018    Bilateral hearing loss 06/20/2017    Hyperlipidemia 12/02/2011    Anemia 12/02/2011    Osteoporosis 12/02/2011    Spina bifida        ROS  Pt denies: Wt loss, Fever/Chills, HA, Visual changes, Fatigue, Chest pain,  Abd pain, N/V/D/C, Blood in stool or urine.  Pertinent positive as above in HPI. All others were negative  Objective:   No flowsheet data found. General: alert, cooperative, no distress   Mental  status: normal mood, behavior, speech, dress, motor activity, and thought processes, able to follow commands   HENT: NCAT   Neck: no visualized mass   Resp: no respiratory distress   Neuro: no gross deficits   Skin: no discoloration or lesions of concern on visible areas   Psychiatric: normal affect, consistent with stated mood, no evidence of hallucinations     Additional exam findings: appears younger than stated age      We discussed the expected course, resolution and complications of the diagnosis(es) in detail. Medication risks, benefits, costs, interactions, and alternatives were discussed as indicated. I advised her to contact the office if her condition worsens, changes or fails to improve as anticipated. She expressed understanding with the diagnosis(es) and plan. [address] [place] [address] Road, who was evaluated through a patient-initiated, synchronous (real-time) audio-video encounter, and/or her healthcare decision maker, is aware that it is a billable service, with coverage as determined by her insurance carrier. She provided verbal consent to proceed: Yes, and patient identification was verified. It was conducted pursuant to the emergency declaration under the [facility], [address] [place] [address] authority and the [employer] General Act. A caregiver was present when appropriate. Ability to conduct physical exam was limited. I was at home. The patient was at home.       Pauline Jaeger MD

## 2020-09-08 NOTE — PATIENT INSTRUCTIONS
Leg and Ankle Edema: Care Instructions Your Care Instructions Swelling in the legs, ankles, and feet is called edema. It is common after you sit or stand for a while. Long plane flights or car rides often cause swelling in the legs and feet. You may also have swelling if you have to stand for long periods of time at your job. Problems with the veins in the legs (varicose veins) and changes in hormones can also cause swelling. Sometimes the swelling in the ankles and feet is caused by a more serious problem, such as heart failure, infection, blood clots, or liver or kidney disease. Follow-up care is a key part of your treatment and safety. Be sure to make and go to all appointments, and call your doctor if you are having problems. It's also a good idea to know your test results and keep a list of the medicines you take. How can you care for yourself at home? · If your doctor gave you medicine, take it as prescribed. Call your doctor if you think you are having a problem with your medicine. · Whenever you are resting, raise your legs up. Try to keep the swollen area higher than the level of your heart. · Take breaks from standing or sitting in one position. ? Walk around to increase the blood flow in your lower legs. ? Move your feet and ankles often while you stand, or tighten and relax your leg muscles. · Wear support stockings. Put them on in the morning, before swelling gets worse. · Eat a balanced diet. Lose weight if you need to. · Limit the amount of salt (sodium) in your diet. Salt holds fluid in the body and may increase swelling. When should you call for help? Call 911 anytime you think you may need emergency care. For example, call if: 
  · You have symptoms of a blood clot in your lung (called a pulmonary embolism). These may include: 
? Sudden chest pain. ? Trouble breathing. ? Coughing up blood. Call your doctor now or seek immediate medical care if:   · You have signs of a blood clot, such as: 
? Pain in your calf, back of the knee, thigh, or groin. ? Redness and swelling in your leg or groin.  
  · You have symptoms of infection, such as: 
? Increased pain, swelling, warmth, or redness. ? Red streaks or pus. ? A fever. Watch closely for changes in your health, and be sure to contact your doctor if: 
  · Your swelling is getting worse.  
  · You have new or worsening pain in your legs.  
  · You do not get better as expected. Where can you learn more? Go to http://tiffani-emre.info/ Enter G726 in the search box to learn more about \"Leg and Ankle Edema: Care Instructions. \" Current as of: June 26, 2019               Content Version: 12.6 © 2344-2971 [employer]. Care instructions adapted under license by [email or web address] (which disclaims liability or warranty for this information). If you have questions about a medical condition or this instruction, always ask your healthcare professional. [place] [address] any warranty or liability for your use of this information.

## 2020-09-08 NOTE — PROGRESS NOTES
Chief Complaint   Patient presents with    Follow Up Chronic Condition     3 month     1. Have you been to the ER, urgent care clinic since your last visit? Hospitalized since your last visit? No    2. Have you seen or consulted any other health care providers outside of the [address] [place] [address] since your last visit? Include any pap smears or colon screening.  No     .  Health Maintenance Due   Topic    DTaP/Tdap/Td series (1 - Tdap)    Shingrix Vaccine Age 50> (1 of 2)    GLAUCOMA SCREENING Q2Y     Bone Densitometry (Dexa) Screening     Pneumococcal 65+ years (2 of 2 - PPSV23)    Flu Vaccine (1)

## 2020-09-15 ENCOUNTER — CLINICAL SUPPORT (OUTPATIENT)
Dept: FAMILY MEDICINE CLINIC | Age: 85
End: 2020-09-15

## 2020-09-15 ENCOUNTER — HOSPITAL ENCOUNTER (OUTPATIENT)
Dept: LAB | Age: 85
Discharge: HOME | End: 2020-09-15
Payer: MEDICAID

## 2020-09-15 VITALS — SYSTOLIC BLOOD PRESSURE: 140 MMHG | DIASTOLIC BLOOD PRESSURE: 79 MMHG

## 2020-09-15 DIAGNOSIS — I10 ESSENTIAL HYPERTENSION: ICD-10-CM

## 2020-09-15 DIAGNOSIS — Z23 NEEDS FLU SHOT: Primary | ICD-10-CM

## 2020-09-15 LAB
ALBUMIN SERPL-MCNC: 3.8 G/DL (ref 3.4–5)
ALBUMIN/GLOB SERPL: 1 {RATIO} (ref 0.8–1.7)
ALP SERPL-CCNC: 57 U/L (ref 45–117)
ALT SERPL-CCNC: 31 U/L (ref 13–56)
ANION GAP SERPL CALC-SCNC: 4 MMOL/L (ref 3–18)
AST SERPL-CCNC: 27 U/L (ref 10–38)
BASOPHILS # BLD: 0 K/UL (ref 0–0.1)
BASOPHILS NFR BLD: 1 % (ref 0–2)
BILIRUB SERPL-MCNC: 0.5 MG/DL (ref 0.2–1)
BUN SERPL-MCNC: 14 MG/DL (ref 7–18)
BUN/CREAT SERPL: 22 (ref 12–20)
CALCIUM SERPL-MCNC: 8.9 MG/DL (ref 8.5–10.1)
CHLORIDE SERPL-SCNC: 108 MMOL/L (ref 100–111)
CHOLEST SERPL-MCNC: 217 MG/DL
CO2 SERPL-SCNC: 30 MMOL/L (ref 21–32)
CREAT SERPL-MCNC: 0.64 MG/DL (ref 0.6–1.3)
DIFFERENTIAL METHOD BLD: ABNORMAL
EOSINOPHIL # BLD: 0.1 K/UL (ref 0–0.4)
EOSINOPHIL NFR BLD: 2 % (ref 0–5)
ERYTHROCYTE [DISTWIDTH] IN BLOOD BY AUTOMATED COUNT: 15 % (ref 11.6–14.5)
GLOBULIN SER CALC-MCNC: 3.8 G/DL (ref 2–4)
GLUCOSE SERPL-MCNC: 92 MG/DL (ref 74–99)
HCT VFR BLD AUTO: 37.4 % (ref 35–45)
HDLC SERPL-MCNC: 68 MG/DL (ref 40–60)
HDLC SERPL: 3.2 {RATIO} (ref 0–5)
HGB BLD-MCNC: 12.1 G/DL (ref 12–16)
LDLC SERPL CALC-MCNC: 119.2 MG/DL (ref 0–100)
LIPID PROFILE,FLP: ABNORMAL
LYMPHOCYTES # BLD: 1.8 K/UL (ref 0.9–3.6)
LYMPHOCYTES NFR BLD: 38 % (ref 21–52)
MCH RBC QN AUTO: 23 PG (ref 24–34)
MCHC RBC AUTO-ENTMCNC: 32.4 G/DL (ref 31–37)
MCV RBC AUTO: 71 FL (ref 74–97)
MONOCYTES # BLD: 0.5 K/UL (ref 0.05–1.2)
MONOCYTES NFR BLD: 10 % (ref 3–10)
NEUTS SEG # BLD: 2.4 K/UL (ref 1.8–8)
NEUTS SEG NFR BLD: 49 % (ref 40–73)
PLATELET # BLD AUTO: 265 K/UL (ref 135–420)
PMV BLD AUTO: 10.3 FL (ref 9.2–11.8)
POTASSIUM SERPL-SCNC: 3.7 MMOL/L (ref 3.5–5.5)
PROT SERPL-MCNC: 7.6 G/DL (ref 6.4–8.2)
RBC # BLD AUTO: 5.27 M/UL (ref 4.2–5.3)
SODIUM SERPL-SCNC: 142 MMOL/L (ref 136–145)
TRIGL SERPL-MCNC: 149 MG/DL (ref ?–150)
VLDLC SERPL CALC-MCNC: 29.8 MG/DL
WBC # BLD AUTO: 4.8 K/UL (ref 4.6–13.2)

## 2020-09-15 PROCEDURE — 80061 LIPID PANEL: CPT

## 2020-09-15 PROCEDURE — 80053 COMPREHEN METABOLIC PANEL: CPT

## 2020-09-15 PROCEDURE — 85025 COMPLETE CBC W/AUTO DIFF WBC: CPT

## 2020-09-15 PROCEDURE — 36415 COLL VENOUS BLD VENIPUNCTURE: CPT

## 2020-09-15 NOTE — PROGRESS NOTES
After obtaining consent, and per orders of Dr. Albaro Rutledge, injection of influenza vaccine given by Maria Elena Chopra LPN. Injection given in left deltoid. Patient tolerated injection well. Patient observed for 10 minutes, with no signs of adverse reactions noted.

## 2020-12-31 NOTE — PROGRESS NOTES
pls let grandma know her cholesterol went down so continue to watch diet, no need for med at this time  Blood sugar, kidney and liver tests were normal Patient/Family

## 2021-03-25 ENCOUNTER — DOCUMENTATION ONLY (OUTPATIENT)
Dept: FAMILY MEDICINE CLINIC | Age: 86
End: 2021-03-25

## 2021-07-26 DIAGNOSIS — I10 ESSENTIAL HYPERTENSION: ICD-10-CM

## 2021-07-26 RX ORDER — AMLODIPINE BESYLATE 5 MG/1
TABLET ORAL
Qty: 90 TABLET | Refills: 0 | Status: SHIPPED | OUTPATIENT
Start: 2021-07-26 | End: 2021-11-09 | Stop reason: SDUPTHER

## 2021-09-08 ENCOUNTER — OFFICE VISIT (OUTPATIENT)
Dept: FAMILY MEDICINE CLINIC | Age: 86
End: 2021-09-08
Payer: MEDICAID

## 2021-09-08 VITALS
WEIGHT: 122.2 LBS | BODY MASS INDEX: 27.49 KG/M2 | SYSTOLIC BLOOD PRESSURE: 130 MMHG | TEMPERATURE: 98 F | HEIGHT: 56 IN | HEART RATE: 62 BPM | DIASTOLIC BLOOD PRESSURE: 72 MMHG | RESPIRATION RATE: 16 BRPM | OXYGEN SATURATION: 97 %

## 2021-09-08 DIAGNOSIS — I10 ESSENTIAL HYPERTENSION: ICD-10-CM

## 2021-09-08 DIAGNOSIS — H25.9 AGE-RELATED CATARACT OF BOTH EYES, UNSPECIFIED AGE-RELATED CATARACT TYPE: ICD-10-CM

## 2021-09-08 DIAGNOSIS — L84 CALLUS OF FOOT: ICD-10-CM

## 2021-09-08 DIAGNOSIS — R63.5 WEIGHT GAIN, ABNORMAL: ICD-10-CM

## 2021-09-08 DIAGNOSIS — R10.9 RIGHT FLANK PAIN: Primary | ICD-10-CM

## 2021-09-08 DIAGNOSIS — Z23 ENCOUNTER FOR IMMUNIZATION: ICD-10-CM

## 2021-09-08 PROCEDURE — 99214 OFFICE O/P EST MOD 30 MIN: CPT | Performed by: INTERNAL MEDICINE

## 2021-09-08 PROCEDURE — 90471 IMMUNIZATION ADMIN: CPT | Performed by: INTERNAL MEDICINE

## 2021-09-08 PROCEDURE — 90694 VACC AIIV4 NO PRSRV 0.5ML IM: CPT | Performed by: INTERNAL MEDICINE

## 2021-09-08 NOTE — PROGRESS NOTES
Patient was given VIS for review, consent was obtained and per orders of Dr. Francisca Pruitt, injection of Fluad influenza given by Paul Hendrickson LPN. Patient observed. No signs nor symptoms of any adverse reactions. Patient tolerated injection well.

## 2021-09-08 NOTE — PATIENT INSTRUCTIONS
Cataract Surgery: Before Your Surgery  What is cataract surgery? Cataracts are cloudy areas in the lens of your eye. Your lens is behind the colored part of your eye (iris). Its job is to focus light onto the back of your eye. In some people, cataracts prevent light from reaching the back of the eye. This can cause vision problems. Cataract surgery helps you see better. It replaces your natural lens, which has become cloudy, with a clear artificial one. There are two types of cataract surgery. Phacoemulsification (say \"unjg-pu-cv-rak-zfl-ypj-SOFIA-shun\") is the most common type. The doctor makes a small cut in your eye. This cut is called an incision. The doctor uses a special ultrasound tool to break your cloudy lens apart. Sometimes a laser is used too. Then he or she removes the small pieces of the lens through the incision. In most cases, the doctor then inserts an artificial lens through the incision. Most people do not need stitches, because the incision is so small. If the doctor is not able to put in an artificial lens, you can wear a contact lens or thick glasses in place of your natural lens. Extracapsular extraction is a less common type of cataract surgery. The doctor makes a larger incision to remove the whole lens at once. After the doctor removes the lens, he or she stitches up the incision. Recovery from this type of surgery takes longer. Before either surgery, the doctor puts numbing drops in your eye. Some doctors use a shot instead. You may also get medicine to make you feel relaxed. You probably will not feel much pain. The surgery takes about 20 to 40 minutes. After surgery, you may have a bandage or shield on your eye. You will probably go home from surgery after 1 hour in the recovery room. Most people see better in 1 to 3 days. You may be able to go back to work or your normal routine in a few days.  It could take 3 to 10 weeks for your eye to completely heal. After your eye heals, you may still need to wear glasses, especially for reading. Follow-up care is a key part of your treatment and safety. Be sure to make and go to all appointments, and call your doctor if you are having problems. It's also a good idea to know your test results and keep a list of the medicines you take. How do you prepare for surgery? Surgery can be stressful. This information will help you understand what you can expect. And it will help you safely prepare for surgery. Preparing for surgery    · Be sure you have someone to take you home. Anesthesia and pain medicine will make it unsafe for you to drive or get home on your own.     · Understand exactly what surgery is planned, along with the risks, benefits, and other options.     · If you take aspirin or some other blood thinner, ask your doctor if you should stop taking it before your surgery. Make sure that you understand exactly what your doctor wants you to do. These medicines increase the risk of bleeding.     · Tell your doctor ALL the medicines, vitamins, supplements, and herbal remedies you take. Some may increase the risk of problems during your surgery. Your doctor will tell you if you should stop taking any of them before the surgery and how soon to do it.     · Make sure your doctor and the hospital have a copy of your advance directive. If you don't have one, you may want to prepare one. It lets others know your health care wishes. It's a good thing to have before any type of surgery or procedure. What happens on the day of surgery? · Follow the instructions exactly about when to stop eating and drinking. If you don't, your surgery may be canceled. If your doctor told you to take your medicines on the day of surgery, take them with only a sip of water.     · Take a bath or shower before you come in for your surgery. Do not apply lotions, perfumes, deodorants, or nail polish.     · Take off all jewelry and piercings.  And take out contact lenses, if you wear them. At the hospital or surgery center   · Bring a picture ID.     · The area for surgery is often marked to make sure there are no errors.     · You will be kept comfortable and safe by your anesthesia provider. The anesthesia may make you sleep. Or it may just numb the area being worked on.     · The surgery will take about 20 to 40 minutes. When should you call your doctor? · You have questions or concerns.     · You don't understand how to prepare for your surgery.     · You become ill before the surgery (such as fever, flu, or a cold).     · You need to reschedule or have changed your mind about having the surgery. Where can you learn more? Go to http://www.gray.com/  Enter K474 in the search box to learn more about \"Cataract Surgery: Before Your Surgery. \"  Current as of: August 31, 2020               Content Version: 12.8  © 2006-2021 [employer]. Care instructions adapted under license by [employer] (which disclaims liability or warranty for this information). If you have questions about a medical condition or this instruction, always ask your healthcare professional. [address] any warranty or liability for your use of this information.

## 2021-09-08 NOTE — PROGRESS NOTES
Patient seen for routine follow up with c/o left foot pain (callous). Patient reports cataract surgery is needed. 1. Have you been to the ER, urgent care clinic since your last visit? Hospitalized since your last visit? No    2. Have you seen or consulted any other health care providers outside of the [address] [place] [address] since your last visit? Include any pap smears or colon screening.  Yes Optometrist     Health Maintenance Due   Topic Date Due    DTaP/Tdap/Td series (1 - Tdap) Never done    Shingrix Vaccine Age 50> (1 of 2) Never done    Bone Densitometry (Dexa) Screening  Never done    Pneumococcal 65+ years (2 of 2 - PPSV23) 03/27/2020    Flu Vaccine (1) 09/01/2021     Flu Vaccine Declined

## 2021-09-08 NOTE — PROGRESS NOTES
Assessment/ Plan:   Diagnoses and all orders for this visit:    1. Right flank pain-if urine culture is neg, will do spine Xrays next  -     CULTURE, URINE; Future    2. Essential hypertension-controlled, continue with Norvasc  -     CBC WITH AUTOMATED DIFF; Future  -     METABOLIC PANEL, COMPREHENSIVE; Future  -     LIPID PANEL; Future    3. Age-related cataract of both eyes, unspecified age-related cataract type-patient is medically cleared to have cataract extraction if needed  -     REFERRAL TO OPHTHALMOLOGY    4. Callus of foot  -     REFERRAL TO PODIATRY    5. Weight gain, abnormal  -     TSH 3RD GENERATION; Future    6. Encounter for immunization  -     THER/PROPH/DIAG INJECTION, SUBCUT/IM  -     FLU (FLUAD QUAD INFLUENZA VACCINE,QUAD,ADJUVANTED)            Follow-up and Dispositions    · Return in about 6 months (around 3/8/2022) for follow up.                        Chief Complaint   Patient presents with    Follow Up Chronic Condition       Pt is a 80y.o. year old female who presents for follow up of her chronic medical problems    Health Maintenance Due   Topic Date Due    DTaP/Tdap/Td series (1 - Tdap) Never done    Shingrix Vaccine Age 50> (1 of 2) Never done    Bone Densitometry (Dexa) Screening -declined bec of cost/does not want to take meds for osteoporosis-advised on daily Ca+d Never done    Pneumococcal 65+ years (2 of 2 - PPSV23) 03/27/2020    Flu Vaccine (1)-today 09/01/2021      Ok to get Covid booster shot shortly bec of her age    Saw [de-identified] bec of blurring of vision-told she needs cataract extraction both eyes  Needs to see Ophtha (Dr Milka Donnelly)      BP Readings from Last 3 Encounters:   09/08/21 130/72   09/15/20 (!) 140/79   03/27/19 133/62   compliant with Norvasc    Occasional wine, no longer drinking beer    Low back pain with occasional pins and needles on the legs  Admits to some smelly urine recently as well  No recent Xrays      Wt Readings from Last 3 Encounters:   09/08/21 122 lb 3.2 oz (55.4 kg)   03/27/19 117 lb (53.1 kg)   10/03/18 112 lb 9.6 oz (51.1 kg)   weight gain all in her tummy she says but has not been walking like she used to prior to pandemic  Lab Results   Component Value Date/Time    TSH 0.785 09/08/2021 11:59 AM          ROS:    Pt denies: Wt loss, Fever/Chills, HA, Visual changes, Fatigue, Chest pain, SOB, WORKMAN, Abd pain, N/V/D/C, Blood in stool or urine, Edema. Pertinent positive as above in HPI. All others were negative    Patient Active Problem List   Diagnosis Code    Spina bifida 26    Hyperlipidemia E78.5    Anemia D64.9    Osteoporosis M81.0    Bilateral hearing loss H91.93    Overweight (BMI 25.0-29. 9) E66.3       Past Medical History:   Diagnosis Date    Arthritis     Chronic pain     GERD (gastroesophageal reflux disease)        Current Outpatient Medications   Medication Sig Dispense Refill    amLODIPine (NORVASC) 5 mg tablet TAKE 1 TABLET BY MOUTH EVERY DAY 90 Tablet 0    calcium-cholecalciferol, D3, (CALTRATE 600+D) tablet Take 1 Tab by mouth daily.            Social History     Tobacco Use   Smoking Status Passive Smoke Exposure - Never Smoker   Smokeless Tobacco Never Used       No Known Allergies    Patient Labs were reviewed: yes    Patient Past Records were reviewed: yes      Objective:     Vitals:    09/08/21 1112   BP: 130/72   Pulse: 62   Resp: 16   Temp: 98 °F (36.7 °C)   TempSrc: Temporal   SpO2: 97%   Weight: 122 lb 3.2 oz (55.4 kg)   Height: 4' 8\" (1.422 m)     Body mass index is 27.4 kg/m². Exam:   Appearance: alert, well appearing,  oriented to person, place, and time, acyanotic, in no respiratory distress and well hydrated.   HEENT:  NC/AT, pink conj, anicteric sclerae, bilateral lens opacity  Neck:  No cervical lymphadenopathy, no JVD, no thyromegaly, no carotid bruit  Heart:  RRR without M/R/G  Lungs:  CTAB, no rhonchi, rales, or wheezes with good air exchange   Abdomen:  Non-tender, pos bowel sounds, no hepatosplenomegaly, no CVA tenderness  Ext:  No C/C/E; hammer toes noted as well as several areas with calluses    Skin: no rash  Neuro: no lateralizing signs, CNs II-XII intact  Reproducible pain on the right paravertebral area  No spinal tenderness          I have discussed the diagnosis with the patient and the intended plan as seen in the above orders. The patient has received an After-Visit Summary and questions were answered concerning future plans. Medication Side Effects and Warnings were discussed with patient: yes    Patient verbalized understanding of above instructions.     Cecily Markham MD  Internal Medicine  [facility]

## 2021-09-11 LAB
ALBUMIN SERPL-MCNC: 4.6 G/DL (ref 3.6–4.6)
ALBUMIN/GLOB SERPL: 1.6 {RATIO} (ref 1.2–2.2)
ALP SERPL-CCNC: 64 IU/L (ref 48–121)
ALT SERPL-CCNC: 22 IU/L (ref 0–32)
AST SERPL-CCNC: 28 IU/L (ref 0–40)
BACTERIA UR CULT: ABNORMAL
BASOPHILS # BLD AUTO: 0 X10E3/UL (ref 0–0.2)
BASOPHILS NFR BLD AUTO: 1 %
BILIRUB SERPL-MCNC: 0.6 MG/DL (ref 0–1.2)
BUN SERPL-MCNC: 10 MG/DL (ref 8–27)
BUN/CREAT SERPL: 14 (ref 12–28)
CALCIUM SERPL-MCNC: 9.4 MG/DL (ref 8.7–10.3)
CHLORIDE SERPL-SCNC: 103 MMOL/L (ref 96–106)
CHOLEST SERPL-MCNC: 223 MG/DL (ref 100–199)
CO2 SERPL-SCNC: 27 MMOL/L (ref 20–29)
CREAT SERPL-MCNC: 0.72 MG/DL (ref 0.57–1)
EOSINOPHIL # BLD AUTO: 0.1 X10E3/UL (ref 0–0.4)
EOSINOPHIL NFR BLD AUTO: 2 %
ERYTHROCYTE [DISTWIDTH] IN BLOOD BY AUTOMATED COUNT: 15.1 % (ref 11.7–15.4)
GLOBULIN SER CALC-MCNC: 2.9 G/DL (ref 1.5–4.5)
GLUCOSE SERPL-MCNC: 111 MG/DL (ref 65–99)
HCT VFR BLD AUTO: 43.1 % (ref 34–46.6)
HDLC SERPL-MCNC: 73 MG/DL
HGB BLD-MCNC: 12.9 G/DL (ref 11.1–15.9)
IMM GRANULOCYTES # BLD AUTO: 0 X10E3/UL (ref 0–0.1)
IMM GRANULOCYTES NFR BLD AUTO: 0 %
IMP & REVIEW OF LAB RESULTS: NORMAL
LDLC SERPL CALC-MCNC: 120 MG/DL (ref 0–99)
LYMPHOCYTES # BLD AUTO: 2 X10E3/UL (ref 0.7–3.1)
LYMPHOCYTES NFR BLD AUTO: 41 %
MCH RBC QN AUTO: 22.8 PG (ref 26.6–33)
MCHC RBC AUTO-ENTMCNC: 29.9 G/DL (ref 31.5–35.7)
MCV RBC AUTO: 76 FL (ref 79–97)
MONOCYTES # BLD AUTO: 0.5 X10E3/UL (ref 0.1–0.9)
MONOCYTES NFR BLD AUTO: 10 %
NEUTROPHILS # BLD AUTO: 2.3 X10E3/UL (ref 1.4–7)
NEUTROPHILS NFR BLD AUTO: 46 %
PLATELET # BLD AUTO: 297 X10E3/UL (ref 150–450)
POTASSIUM SERPL-SCNC: 4.4 MMOL/L (ref 3.5–5.2)
PROT SERPL-MCNC: 7.5 G/DL (ref 6–8.5)
RBC # BLD AUTO: 5.65 X10E6/UL (ref 3.77–5.28)
SODIUM SERPL-SCNC: 143 MMOL/L (ref 134–144)
TRIGL SERPL-MCNC: 171 MG/DL (ref 0–149)
TSH SERPL DL<=0.005 MIU/L-ACNC: 0.79 UIU/ML (ref 0.45–4.5)
VLDLC SERPL CALC-MCNC: 30 MG/DL (ref 5–40)
WBC # BLD AUTO: 4.9 X10E3/UL (ref 3.4–10.8)

## 2021-09-12 DIAGNOSIS — N39.0 URINARY TRACT INFECTION WITHOUT HEMATURIA, SITE UNSPECIFIED: Primary | ICD-10-CM

## 2021-09-12 RX ORDER — NITROFURANTOIN 25; 75 MG/1; MG/1
100 CAPSULE ORAL 2 TIMES DAILY
Qty: 14 CAPSULE | Refills: 0 | Status: SHIPPED | OUTPATIENT
Start: 2021-09-12 | End: 2021-09-19

## 2021-09-13 NOTE — PROGRESS NOTES
Pls let patient's daughter know I sent antibiotics to her pharmacy for UTI (urine culture grew bacteria  The rest of the labs were normal/stable (still with slightly elevated cholesterol and blood sugar-no need for med at this time)

## 2021-10-22 ENCOUNTER — IMPORTED ENCOUNTER (OUTPATIENT)
Dept: URBAN - METROPOLITAN AREA CLINIC 1 | Facility: CLINIC | Age: 86
End: 2021-10-22

## 2021-10-22 PROBLEM — H02.831: Noted: 2021-10-22

## 2021-10-22 PROBLEM — H25.813: Noted: 2021-10-22

## 2021-10-22 PROBLEM — H43.22: Noted: 2021-10-22

## 2021-10-22 PROBLEM — H04.123: Noted: 2021-10-22

## 2021-10-22 PROBLEM — H02.834: Noted: 2021-10-22

## 2021-10-22 PROBLEM — H16.143: Noted: 2021-10-22

## 2021-10-22 PROCEDURE — 92015 DETERMINE REFRACTIVE STATE: CPT

## 2021-10-22 PROCEDURE — 92004 COMPRE OPH EXAM NEW PT 1/>: CPT

## 2021-10-22 NOTE — PATIENT DISCUSSION
1.  Cataract OU -- Visually Significant secondary to glare discussed the risks benefits alternatives and limitations of cataract surgery. The patient stated a full understanding and a desire to proceed with the procedure. The patient will need to return for preop appointment with cataract measurements and to have any additional questions answered and start pre-operative eye drops as directed. **PMG did not see today**Phaco PCL OS then OD Otherwise follow-up in 6 months DFE/Glare2. VIKAS w/ PEK OU -- Recommended ATs TID OU routinely. 3.  Dermatochalasis OU UL's -- Follow with no intervention at this time. Consider Ptosis VF/Bleph with progression. 4.  Asteroid Hyalosis OS -- RD Precautions. **All conditions discussed with patients daughter at today's visit**Return for an appointment for 10/Ascfatoumata/HP with Dr. Matias Macias.

## 2021-10-22 NOTE — PATIENT DISCUSSION
Dermatochalasis OU UL's -- Follow with no intervention at this time. Consider Ptosis VF/Bleph with progression.

## 2021-11-09 DIAGNOSIS — I10 ESSENTIAL HYPERTENSION: ICD-10-CM

## 2021-11-09 RX ORDER — AMLODIPINE BESYLATE 5 MG/1
5 TABLET ORAL DAILY
Qty: 90 TABLET | Refills: 1 | Status: SHIPPED | OUTPATIENT
Start: 2021-11-09 | End: 2022-06-09

## 2021-11-23 ENCOUNTER — IMPORTED ENCOUNTER (OUTPATIENT)
Dept: URBAN - METROPOLITAN AREA CLINIC 1 | Facility: CLINIC | Age: 86
End: 2021-11-23

## 2021-11-23 PROBLEM — H25.813: Noted: 2021-11-23

## 2021-11-23 PROCEDURE — 92012 INTRM OPH EXAM EST PATIENT: CPT

## 2021-11-23 PROCEDURE — 92136 OPHTHALMIC BIOMETRY: CPT

## 2021-11-23 NOTE — PATIENT DISCUSSION
1. Cataract OU -- PSC Component OD. Visually Significant secondary to glare OU discussed the risks benefits alternatives and limitations of cataract surgery. The patient stated a full understanding and a desire to proceed with the procedure. Discussed with patient if PO Gtts are more than $120 for all three combined when filling at their Pharmacy please call our office to request generic substitutions. Pt came to pre-op appointment today with daughter and Lifestyle Questionnaire Completed. Pt understands they will need glasses post-op to achieve their best corrected vision. *Guarded visual prognosis secondary to poor view OD secondary to [facility] Cataract Phaco PCL OS then ODPhaco PCL OS2. VIKAS w/ PEK OU -- Continue ATs TID OU routinely. 3.  Dermatochalasis OU UL's -- Follow with no intervention at this time. Consider Ptosis VF/Bleph with progression. 4.  Asteroid Hyalosis OS -- RD Precautions.  **All conditions discussed with patients daughter at today's visit**Return as scheduled

## 2021-12-01 ENCOUNTER — IMPORTED ENCOUNTER (OUTPATIENT)
Dept: URBAN - METROPOLITAN AREA CLINIC 1 | Facility: CLINIC | Age: 86
End: 2021-12-01

## 2021-12-02 ENCOUNTER — IMPORTED ENCOUNTER (OUTPATIENT)
Dept: URBAN - METROPOLITAN AREA CLINIC 1 | Facility: CLINIC | Age: 86
End: 2021-12-02

## 2021-12-02 PROBLEM — Z96.1: Noted: 2021-12-02

## 2021-12-02 PROCEDURE — 99024 POSTOP FOLLOW-UP VISIT: CPT

## 2021-12-02 NOTE — PATIENT DISCUSSION
POD#1 CE/IOL OS (Standard) -- doing well. *Tr edema Use Tobradex ST BID OS & Prolensa QD OS: Use all three gtts through completion of PO gtt chart regimen/ Per our instructions given to patient.   Post op Warnings Reiterated RTC as scheduled

## 2021-12-07 ENCOUNTER — IMPORTED ENCOUNTER (OUTPATIENT)
Dept: URBAN - METROPOLITAN AREA CLINIC 1 | Facility: CLINIC | Age: 86
End: 2021-12-07

## 2021-12-07 PROBLEM — H25.811: Noted: 2021-12-07

## 2021-12-07 PROCEDURE — 92136 OPHTHALMIC BIOMETRY: CPT

## 2021-12-07 NOTE — PATIENT DISCUSSION
1.  Cataract OD: Visually Significant secondary to glare discussed the risks benefits alternatives and limitations of cataract surgery. The patient stated a full understanding and a desire to proceed with the procedure. Discussed with patient if PO Gtts are more than $120 for all three combined when filling at their Pharmacy please call our office to request generic substitutions. Pt understands they will need glasses post-op to achieve their best corrected vision. Phaco PCL OD 2. POW#3  CE/IOL Standard OS doing well. Use Tobradex ST BID OS and Prolensa Qdaily OS: Use gtts through completion of PO gtt chart regimen.  F/u as scheduled 2nd eye

## 2021-12-17 ENCOUNTER — IMPORTED ENCOUNTER (OUTPATIENT)
Dept: URBAN - METROPOLITAN AREA CLINIC 1 | Facility: CLINIC | Age: 86
End: 2021-12-17

## 2021-12-18 ENCOUNTER — IMPORTED ENCOUNTER (OUTPATIENT)
Dept: URBAN - METROPOLITAN AREA CLINIC 1 | Facility: CLINIC | Age: 86
End: 2021-12-18

## 2021-12-18 PROBLEM — Z96.1: Noted: 2021-12-18

## 2021-12-18 PROCEDURE — 99024 POSTOP FOLLOW-UP VISIT: CPT

## 2021-12-18 NOTE — PATIENT DISCUSSION
1. POD#1 Phaco/ PCL OD (Standard) - doing well. Use Tobradex ST BID OD & Prolensa QD OD: Use all three gtts through completion of PO gtt chart regimen/ Per our instructions given. Post op Warnings Reiterated 2.  POW#3 Phaco/ PCL OS (Standard) - doing well Use Tobradex ST BID OS & Prolensa QD OS RTC as scheduled

## 2022-01-10 ENCOUNTER — IMPORTED ENCOUNTER (OUTPATIENT)
Dept: URBAN - METROPOLITAN AREA CLINIC 1 | Facility: CLINIC | Age: 87
End: 2022-01-10

## 2022-01-10 PROBLEM — Z09: Noted: 2022-01-10

## 2022-01-10 PROCEDURE — 99024 POSTOP FOLLOW-UP VISIT: CPT

## 2022-01-10 NOTE — PATIENT DISCUSSION
POW#3 Phaco/ PCL Standard OU PCO noted OU today will reassess next visit. Finish PO meds per PO gtt schedule MRX for glasses given Return for an appointment in September for a 30/glare with Dr. Cristy Burks.

## 2022-03-10 ENCOUNTER — OFFICE VISIT (OUTPATIENT)
Dept: FAMILY MEDICINE CLINIC | Age: 87
End: 2022-03-10
Payer: MEDICAID

## 2022-03-10 VITALS
WEIGHT: 120.3 LBS | DIASTOLIC BLOOD PRESSURE: 69 MMHG | RESPIRATION RATE: 16 BRPM | OXYGEN SATURATION: 97 % | HEIGHT: 56 IN | HEART RATE: 77 BPM | BODY MASS INDEX: 27.06 KG/M2 | SYSTOLIC BLOOD PRESSURE: 138 MMHG | TEMPERATURE: 98.2 F

## 2022-03-10 DIAGNOSIS — L84 CALLUS OF FOOT: ICD-10-CM

## 2022-03-10 DIAGNOSIS — B35.1 ONYCHOMYCOSIS: ICD-10-CM

## 2022-03-10 DIAGNOSIS — I10 ESSENTIAL HYPERTENSION: Primary | ICD-10-CM

## 2022-03-10 DIAGNOSIS — Z98.49 STATUS POST CATARACT EXTRACTION, UNSPECIFIED LATERALITY: ICD-10-CM

## 2022-03-10 PROCEDURE — 99214 OFFICE O/P EST MOD 30 MIN: CPT | Performed by: INTERNAL MEDICINE

## 2022-03-10 RX ORDER — CICLOPIROX 80 MG/ML
SOLUTION TOPICAL
Qty: 6.6 ML | Refills: 1 | Status: SHIPPED | OUTPATIENT
Start: 2022-03-10

## 2022-03-10 NOTE — PROGRESS NOTES
Patient seen for routine follow up     1. \"Have you been to the ER, urgent care clinic since your last visit? Hospitalized since your last visit? \" No    2. \"Have you seen or consulted any other health care providers outside of the [address] [place] [address] since your last visit? \" Yes Cataract surgery both eye     3. For patients aged 39-70: Has the patient had a colonoscopy / FIT/ Cologuard? NA - based on age      If the patient is female:    4. For patients aged 41-77: Has the patient had a mammogram within the past 2 years? NA - based on age or sex      11. For patients aged 21-65: Has the patient had a pap smear?  NA - based on age or sex      Health Maintenance Due   Topic Date Due    DTaP/Tdap/Td series (1 - Tdap) Never done    Shingrix Vaccine Age 50> (1 of 2) Never done    Bone Densitometry (Dexa) Screening  Never done    Pneumococcal 65+ years (2 of 2 - PPSV23) 03/27/2020

## 2022-03-10 NOTE — PATIENT INSTRUCTIONS
Toenail Fungus: Care Instructions  Overview  A nail that is infected by a fungus usually turns white or yellow. As the fungus spreads, the nail turns a darker color and gets thicker. And the nail edges start to turn ragged and crumble. A bad infection can cause pain, and the nail may pull away from the toe or finger. Nails that are exposed to moisture and warmth a lot are more likely to get infected by a fungus. This can happen from wearing sweaty shoes often and from walking barefoot on shower floors. Or it can happen if you share personal things, such as towels and nail clippers. It's hard to treat nail fungus. And the infection can return after it has cleared up. But medicines can sometimes get rid of nail fungus for good. If the infection is very bad, or if it causes a lot of pain, you may need to have the nail removed. Follow-up care is a key part of your treatment and safety. Be sure to make and go to all appointments, and call your doctor if you are having problems. It's also a good idea to know your test results and keep a list of the medicines you take. How can you care for yourself at home? · Take your medicines exactly as prescribed. Call your doctor if you have any problems with your medicine. · If your doctor gave you a cream or liquid to put on your nail, use it exactly as directed. · Wash your hands and feet often, and wash your hands after touching your feet. · Keep your nails clean and dry. Dry your feet completely after you bathe and before you put on shoes and socks. · Keep your nails trimmed. · Change socks often. Wear dry socks that absorb moisture. · Don't go barefoot in public places. · Use a spray or powder that fights fungus on your feet and in your shoes. · Don't pick at the skin around your nails. · Don't use nail polish or fake nails on your nails. · Don't share personal things, such as towels and nail clippers. When should you call for help?    Call your doctor now or seek immediate medical care if:    · You have signs of infection, such as:  ? Increased pain, swelling, warmth, or redness. ? Red streaks leading from the site. ? Pus draining from the site. ? A fever.     · You have new or increased toe pain. Watch closely for changes in your health, and be sure to contact your doctor if:    · You do not get better as expected. Where can you learn more? Go to http://www.gray.com/  Enter D202 in the search box to learn more about \"Toenail Fungus: Care Instructions. \"  Current as of: November 15, 2021               Content Version: 13.2  © 4946-2763 [employer]. Care instructions adapted under license by [employer] (which disclaims liability or warranty for this information). If you have questions about a medical condition or this instruction, always ask your healthcare professional. [address] any warranty or liability for your use of this information.

## 2022-03-10 NOTE — PROGRESS NOTES
Assessment/ Plan:   Diagnoses and all orders for this visit:    1. Essential hypertension-controlled, continue with Norvasc    2. Callus of foot -painful, getting worse  -     REFERRAL TO PODIATRY    3. Onychomycosis  -     ciclopirox (PENLAC) 8 % solution; Apply to affected nails at night    4. Status post cataract extraction, unspecified laterality-a success        Follow-up and Dispositions    · Return in about 6 months (around 9/10/2022) for follow up.                      Chief Complaint   Patient presents with    Follow Up Chronic Condition       Pt is a 80y.o. year old female who presents for follow up of her chronic medical problems    Health Maintenance Due   Topic Date Due    DTaP/Tdap/Td series (1 - Tdap) Never done    Shingrix Vaccine Age 50> (1 of 2) Never done    Bone Densitometry (Dexa) Screening -declined; take Ca+D Never done    Pneumococcal 65+ years (2 of 2 - PPSV23) 03/27/2020    declines immunizations      Wt Readings from Last 3 Encounters:   03/10/22 120 lb 4.8 oz (54.6 kg)   09/08/21 122 lb 3.2 oz (55.4 kg)   03/27/19 117 lb (53.1 kg)       BP Readings from Last 3 Encounters:   03/10/22 138/69   09/08/21 130/72   09/15/20 (!) 140/79   taking BP med    Lab Results   Component Value Date/Time    Cholesterol, total 223 (H) 09/08/2021 11:59 AM    HDL Cholesterol 73 09/08/2021 11:59 AM    LDL-C, External 114 03/20/2017 12:00 AM    LDL, calculated 120 (H) 09/08/2021 11:59 AM    LDL, calculated 119.2 (H) 09/15/2020 11:15 AM    VLDL, calculated 30 09/08/2021 11:59 AM    VLDL, calculated 29.8 09/15/2020 11:15 AM    Triglyceride 171 (H) 09/08/2021 11:59 AM    CHOL/HDL Ratio 3.2 09/15/2020 11:15 AM       Cataract extraction -bilat, successful; still needs readers    Hearing aid-cannot afford    Callus of the right big toe    Cramping on the legs, intermittent    Longevity in her family-siblings in their 90\"s    Occasional alcohol intake    ROS:    Pt denies: Wt loss, Fever/Chills, HA, Visual changes, Fatigue, Chest pain, SOB, WORKMAN, Abd pain, N/V/D/C, Blood in stool or urine, Edema. Pertinent positive as above in HPI. All others were negative    Patient Active Problem List   Diagnosis Code    Spina bifida 26    Hyperlipidemia E78.5    Anemia D64.9    Osteoporosis M81.0    Bilateral hearing loss H91.93    Overweight (BMI 25.0-29. 9) E66.3       Past Medical History:   Diagnosis Date    Arthritis     Chronic pain     GERD (gastroesophageal reflux disease)        Current Outpatient Medications   Medication Sig Dispense Refill    ciclopirox (PENLAC) 8 % solution Apply to affected nails at night 6.6 mL 1    amLODIPine (NORVASC) 5 mg tablet Take 1 Tablet by mouth daily. 90 Tablet 1    calcium-cholecalciferol, D3, (CALTRATE 600+D) tablet Take 1 Tab by mouth daily. Social History     Tobacco Use   Smoking Status Passive Smoke Exposure - Never Smoker   Smokeless Tobacco Never Used       No Known Allergies    Patient Labs were reviewed: yes    Patient Past Records were reviewed: yes      Objective:     Vitals:    03/10/22 1048   BP: 138/69   Pulse: 77   Resp: 16   Temp: 98.2 °F (36.8 °C)   TempSrc: Temporal   SpO2: 97%   Weight: 120 lb 4.8 oz (54.6 kg)   Height: 4' 8\" (1.422 m)     Body mass index is 26.97 kg/m². Exam:   Appearance: alert, well appearing,  oriented to person, place, and time, acyanotic, in no respiratory distress and well hydrated. HEENT:  NC/AT, pink conj, anicteric sclerae  Neck:  No cervical lymphadenopathy, no JVD, no thyromegaly, no carotid bruit  Heart:  RRR without M/R/G  Lungs:  CTAB, no rhonchi, rales, or wheezes with good air exchange   Abdomen:  Non-tender, pos bowel sounds, no hepatosplenomegaly  Ext:  No C/C/E, callus on the medial side of the right big toe    Skin: no rash, thickened and dark toenails  Neuro: no lateralizing signs, CNs II-XII intact            I have discussed the diagnosis with the patient and the intended plan as seen in the above orders. The patient has received an After-Visit Summary and questions were answered concerning future plans. Medication Side Effects and Warnings were discussed with patient: yes    Patient verbalized understanding of above instructions.     Nelda Olvera MD  Internal Medicine  [facility]

## 2022-03-19 PROBLEM — E66.3 OVERWEIGHT (BMI 25.0-29.9): Status: ACTIVE | Noted: 2018-10-03

## 2022-03-20 PROBLEM — H91.93 BILATERAL HEARING LOSS: Status: ACTIVE | Noted: 2017-06-20

## 2022-04-02 ASSESSMENT — VISUAL ACUITY
OD_GLARE: 20/400
OS_CC: 20/30-1
OD_CC: 20/150
OD_CC: 20/150
OD_GLARE: 20/400
OS_CC: 20/25
OD_SC: J16
OS_SC: J16
OD_SC: 20/30
OD_SC: J16
OD_SC: 20/30
OS_GLARE: 20/200
OS_CC: 20/30-2
OS_SC: 20/30
OS_CC: 20/25
OD_CC: 20/30
OD_CC: 20/25
OD_CC: 20/150
OS_SC: J16
OD_GLARE: 20/400
OS_CC: 20/30
OS_GLARE: 20/200
OS_CC: 20/30-1

## 2022-04-02 ASSESSMENT — KERATOMETRY
OS_K1POWER_DIOPTERS: 43.50
OS_K2POWER_DIOPTERS: 45.00
OS_AXISANGLE_DEGREES: 164
OS_AXISANGLE2_DEGREES: 074

## 2022-04-02 ASSESSMENT — TONOMETRY
OD_IOP_MMHG: 14
OD_IOP_MMHG: 15
OS_IOP_MMHG: 14
OS_IOP_MMHG: 14
OS_IOP_MMHG: 13
OD_IOP_MMHG: 14
OS_IOP_MMHG: 13
OD_IOP_MMHG: 14
OS_IOP_MMHG: 15
OS_IOP_MMHG: 14

## 2022-06-09 DIAGNOSIS — I10 ESSENTIAL HYPERTENSION: ICD-10-CM

## 2022-06-09 RX ORDER — AMLODIPINE BESYLATE 5 MG/1
TABLET ORAL
Qty: 90 TABLET | Refills: 1 | Status: SHIPPED | OUTPATIENT
Start: 2022-06-09 | End: 2022-09-08 | Stop reason: SDUPTHER

## 2022-09-08 ENCOUNTER — OFFICE VISIT (OUTPATIENT)
Dept: FAMILY MEDICINE CLINIC | Age: 87
End: 2022-09-08
Payer: MEDICAID

## 2022-09-08 VITALS
HEART RATE: 86 BPM | BODY MASS INDEX: 26.77 KG/M2 | RESPIRATION RATE: 16 BRPM | TEMPERATURE: 98.2 F | DIASTOLIC BLOOD PRESSURE: 66 MMHG | OXYGEN SATURATION: 99 % | HEIGHT: 56 IN | WEIGHT: 119 LBS | SYSTOLIC BLOOD PRESSURE: 139 MMHG

## 2022-09-08 DIAGNOSIS — R63.5 WEIGHT GAIN, ABNORMAL: ICD-10-CM

## 2022-09-08 DIAGNOSIS — I10 ESSENTIAL HYPERTENSION: Primary | ICD-10-CM

## 2022-09-08 DIAGNOSIS — R10.9 ABDOMINAL CRAMPS: ICD-10-CM

## 2022-09-08 DIAGNOSIS — Z23 NEEDS FLU SHOT: ICD-10-CM

## 2022-09-08 DIAGNOSIS — K64.9 HEMORRHOIDS, UNSPECIFIED HEMORRHOID TYPE: ICD-10-CM

## 2022-09-08 DIAGNOSIS — R10.9 RIGHT FLANK PAIN: ICD-10-CM

## 2022-09-08 PROCEDURE — 99214 OFFICE O/P EST MOD 30 MIN: CPT | Performed by: INTERNAL MEDICINE

## 2022-09-08 PROCEDURE — 1123F ACP DISCUSS/DSCN MKR DOCD: CPT | Performed by: INTERNAL MEDICINE

## 2022-09-08 RX ORDER — DICYCLOMINE HYDROCHLORIDE 10 MG/1
10 CAPSULE ORAL
Qty: 30 CAPSULE | Refills: 0 | Status: SHIPPED | OUTPATIENT
Start: 2022-09-08

## 2022-09-08 RX ORDER — AMLODIPINE BESYLATE 5 MG/1
5 TABLET ORAL DAILY
Qty: 90 TABLET | Refills: 1 | Status: SHIPPED | OUTPATIENT
Start: 2022-09-08

## 2022-09-08 NOTE — PROGRESS NOTES
Assessment/ Plan:   Diagnoses and all orders for this visit:    1. Essential hypertension-controlled, continue present meds  -     amLODIPine (NORVASC) 5 mg tablet; Take 1 Tablet by mouth daily.  -     CBC WITH AUTOMATED DIFF; Future  -     METABOLIC PANEL, COMPREHENSIVE; Future  -     LIPID PANEL; Future    2. Abdominal cramps-exam is benign  -     CBC WITH AUTOMATED DIFF; Future  -     dicyclomine (BENTYL) 10 mg capsule; Take 1 Capsule by mouth three (3) times daily as needed for Abdominal Cramps. 3. Hemorrhoids, unspecified hemorrhoid type-advised to avoid being constipated  -     CBC WITH AUTOMATED DIFF; Future    4. Weight gain, abnormal  -     TSH 3RD GENERATION    5. Right flank pain-? appears muscular but will make sure there is no urine infection  -     CULTURE, URINE    6. Needs flu shot  -     INFLUENZA, FLUAD, (AGE 72 Y+), IM, PF, 0.5 ML          Follow-up and Dispositions    Return in about 6 months (around 3/8/2023) for follow up.                      Chief Complaint   Patient presents with    Follow Up Chronic Condition       Pt is a 80y.o. year old female who presents for follow up of her chronic medical problems    Health Maintenance Due   Topic Date Due    DTaP/Tdap/Td series (1 - Tdap) Never done    Shingrix Vaccine Age 50> (1 of 2) Never done    Bone Densitometry (Dexa) Screening  Never done    Pneumococcal 65+ years (2 - PPSV23 or PCV20) 03/27/2020    COVID-19 Vaccine (4 - Booster for Pfizer series) 02/22/2022    Flu Vaccine (1) 09/01/2022-today      Wt Readings from Last 3 Encounters:   09/08/22 119 lb (54 kg)   03/10/22 120 lb 4.8 oz (54.6 kg)   09/08/21 122 lb 3.2 oz (55.4 kg)        BP Readings from Last 3 Encounters:   09/08/22 139/66   03/10/22 138/69   09/08/21 130/72    On Amlodipine-compliant    [id number] after catarat surgery-Dr Alexandr Sanders; has appt soon      Labs today-fasting      ROS:    Pt denies: Wt loss, Fever/Chills, HA, Visual changes, Fatigue, Chest pain, SOB, WORKMAN, Abd pain, N/V/D/C, Blood in stool or urine, Edema. Pertinent positive as above in HPI. All others were negative    Patient Active Problem List   Diagnosis Code    Spina bifida 26    Hyperlipidemia E78.5    Anemia D64.9    Osteoporosis M81.0    Bilateral hearing loss H91.93    Overweight (BMI 25.0-29. 9) E66.3       Past Medical History:   Diagnosis Date    Arthritis     Chronic pain     GERD (gastroesophageal reflux disease)        Current Outpatient Medications   Medication Sig Dispense Refill    amLODIPine (NORVASC) 5 mg tablet Take 1 Tablet by mouth daily. 90 Tablet 1    dicyclomine (BENTYL) 10 mg capsule Take 1 Capsule by mouth three (3) times daily as needed for Abdominal Cramps. 30 Capsule 0    ciclopirox (PENLAC) 8 % solution Apply to affected nails at night 6.6 mL 1    calcium-cholecalciferol, D3, (CALTRATE 600+D) tablet Take 1 Tab by mouth daily. Social History     Tobacco Use   Smoking Status Passive Smoke Exposure - Never Smoker   Smokeless Tobacco Never       No Known Allergies    Patient Labs were reviewed: yes    Patient Past Records were reviewed: yes      Objective:     Vitals:    09/08/22 1103   BP: 139/66   Pulse: 86   Resp: 16   Temp: 98.2 °F (36.8 °C)   TempSrc: Temporal   SpO2: 99%   Weight: 119 lb (54 kg)   Height: 4' 8\" (1.422 m)     Body mass index is 26.68 kg/m². Exam:   Appearance: alert, well appearing,  oriented to person, place, and time, acyanotic, in no respiratory distress and well hydrated. HEENT:  NC/AT, pink conj, anicteric sclerae  Neck:  No cervical lymphadenopathy, no JVD, no thyromegaly, no carotid bruit  Heart:  RRR without M/R/G  Lungs:  CTAB, no rhonchi, rales, or wheezes with good air exchange   Abdomen:  Non-tender, pos bowel sounds, no hepatosplenomegaly  Ext:  No C/C/E    Skin: no rash  Neuro: no lateralizing signs, CNs II-XII intact            I have discussed the diagnosis with the patient and the intended plan as seen in the above orders.   The patient has received an After-Visit Summary and questions were answered concerning future plans. Medication Side Effects and Warnings were discussed with patient: yes    Patient verbalized understanding of above instructions.     Julius Olivera MD  Internal Medicine  [facility]

## 2022-09-08 NOTE — PROGRESS NOTES
Patient seen for routine follow up with c/o epigastric pain and scant amount of blood while trying to have bowel movement one day ago. Health Maintenance Due   Topic Date Due    DTaP/Tdap/Td series (1 - Tdap) Never done    Shingrix Vaccine Age 50> (1 of 2) Never done    Bone Densitometry (Dexa) Screening  Never done    Pneumococcal 65+ years (2 - PPSV23 or PCV20) 03/27/2020    COVID-19 Vaccine (4 - Booster for Pfizer series) 02/22/2022    Flu Vaccine (1) 09/01/2022     1. \"Have you been to the ER, urgent care clinic since your last visit? Hospitalized since your last visit? \" No    2. \"Have you seen or consulted any other health care providers outside of the [address] [place] [address] since your last visit? \" No     3. For patients aged 39-70: Has the patient had a colonoscopy / FIT/ Cologuard? NA - based on age      If the patient is female:    4. For patients aged 41-77: Has the patient had a mammogram within the past 2 years? NA - based on age or sex      11. For patients aged 21-65: Has the patient had a pap smear? NA - based on age or sex    Patient was given VIS for review,consent was obtained and per orders of Dr. Latoya Major, injection of FLUAD given by [facility]. Patient observed. No signs nor symptoms of any adverse reactions. Patient tolerated injection well.

## 2022-09-09 PROCEDURE — 90694 VACC AIIV4 NO PRSRV 0.5ML IM: CPT | Performed by: INTERNAL MEDICINE

## 2022-09-16 LAB
ALBUMIN SERPL-MCNC: 4.5 G/DL (ref 3.6–4.6)
ALBUMIN/GLOB SERPL: 1.7 {RATIO} (ref 1.2–2.2)
ALP SERPL-CCNC: 69 IU/L (ref 44–121)
ALT SERPL-CCNC: 20 IU/L (ref 0–32)
AST SERPL-CCNC: 27 IU/L (ref 0–40)
BACTERIA UR CULT: ABNORMAL
BACTERIA UR CULT: ABNORMAL
BASOPHILS # BLD AUTO: 0 X10E3/UL (ref 0–0.2)
BASOPHILS NFR BLD AUTO: 1 %
BILIRUB SERPL-MCNC: 0.3 MG/DL (ref 0–1.2)
BUN SERPL-MCNC: 13 MG/DL (ref 8–27)
BUN/CREAT SERPL: 22 (ref 12–28)
CALCIUM SERPL-MCNC: 9.2 MG/DL (ref 8.7–10.3)
CHLORIDE SERPL-SCNC: 102 MMOL/L (ref 96–106)
CHOLEST SERPL-MCNC: 233 MG/DL (ref 100–199)
CO2 SERPL-SCNC: 25 MMOL/L (ref 20–29)
CREAT SERPL-MCNC: 0.6 MG/DL (ref 0.57–1)
EGFR: 86 ML/MIN/1.73
EOSINOPHIL # BLD AUTO: 0 X10E3/UL (ref 0–0.4)
EOSINOPHIL NFR BLD AUTO: 1 %
ERYTHROCYTE [DISTWIDTH] IN BLOOD BY AUTOMATED COUNT: 16 % (ref 11.7–15.4)
GLOBULIN SER CALC-MCNC: 2.7 G/DL (ref 1.5–4.5)
GLUCOSE SERPL-MCNC: 95 MG/DL (ref 65–99)
HCT VFR BLD AUTO: 41.2 % (ref 34–46.6)
HDLC SERPL-MCNC: 76 MG/DL
HGB BLD-MCNC: 12.5 G/DL (ref 11.1–15.9)
IMM GRANULOCYTES # BLD AUTO: 0 X10E3/UL (ref 0–0.1)
IMM GRANULOCYTES NFR BLD AUTO: 0 %
IMP & REVIEW OF LAB RESULTS: NORMAL
LDLC SERPL CALC-MCNC: 130 MG/DL (ref 0–99)
LYMPHOCYTES # BLD AUTO: 1.4 X10E3/UL (ref 0.7–3.1)
LYMPHOCYTES NFR BLD AUTO: 24 %
MCH RBC QN AUTO: 22.3 PG (ref 26.6–33)
MCHC RBC AUTO-ENTMCNC: 30.3 G/DL (ref 31.5–35.7)
MCV RBC AUTO: 73 FL (ref 79–97)
MONOCYTES # BLD AUTO: 0.4 X10E3/UL (ref 0.1–0.9)
MONOCYTES NFR BLD AUTO: 7 %
NEUTROPHILS # BLD AUTO: 3.9 X10E3/UL (ref 1.4–7)
NEUTROPHILS NFR BLD AUTO: 67 %
PLATELET # BLD AUTO: 278 X10E3/UL (ref 150–450)
POTASSIUM SERPL-SCNC: 3.6 MMOL/L (ref 3.5–5.2)
PROT SERPL-MCNC: 7.2 G/DL (ref 6–8.5)
RBC # BLD AUTO: 5.61 X10E6/UL (ref 3.77–5.28)
SODIUM SERPL-SCNC: 142 MMOL/L (ref 134–144)
TRIGL SERPL-MCNC: 154 MG/DL (ref 0–149)
TSH SERPL DL<=0.005 MIU/L-ACNC: 0.87 UIU/ML (ref 0.45–4.5)
VLDLC SERPL CALC-MCNC: 27 MG/DL (ref 5–40)
WBC # BLD AUTO: 5.8 X10E3/UL (ref 3.4–10.8)

## 2022-09-22 DIAGNOSIS — N39.0 URINARY TRACT INFECTION WITHOUT HEMATURIA, SITE UNSPECIFIED: Primary | ICD-10-CM

## 2022-09-22 RX ORDER — SULFAMETHOXAZOLE AND TRIMETHOPRIM 800; 160 MG/1; MG/1
1 TABLET ORAL 2 TIMES DAILY
Qty: 14 TABLET | Refills: 0 | Status: SHIPPED | OUTPATIENT
Start: 2022-09-22 | End: 2022-09-29

## 2022-09-22 NOTE — PROGRESS NOTES
The ASCVD Risk score (Lisa MARTINO, et al., 2019) failed to calculate for the following reasons:     The 2019 ASCVD risk score is only valid for ages 36 to 78

## 2022-11-28 ENCOUNTER — COMPREHENSIVE EXAM (OUTPATIENT)
Dept: URBAN - METROPOLITAN AREA CLINIC 1 | Facility: CLINIC | Age: 87
End: 2022-11-28

## 2022-11-28 DIAGNOSIS — Z96.1: ICD-10-CM

## 2022-11-28 DIAGNOSIS — H04.123: ICD-10-CM

## 2022-11-28 DIAGNOSIS — H26.493: ICD-10-CM

## 2022-11-28 DIAGNOSIS — H35.033: ICD-10-CM

## 2022-11-28 DIAGNOSIS — H02.834: ICD-10-CM

## 2022-11-28 DIAGNOSIS — H02.831: ICD-10-CM

## 2022-11-28 DIAGNOSIS — H35.3131: ICD-10-CM

## 2022-11-28 DIAGNOSIS — H43.22: ICD-10-CM

## 2022-11-28 DIAGNOSIS — H16.143: ICD-10-CM

## 2022-11-28 PROCEDURE — 99214 OFFICE O/P EST MOD 30 MIN: CPT

## 2022-11-28 ASSESSMENT — VISUAL ACUITY
OD_SC: J5
OD_BAT: 20/40
OD_SC: 20/25
OS_SC: J3
OS_SC: 20/25
OU_SC: J3
OS_BAT: 20/40

## 2022-11-28 ASSESSMENT — TONOMETRY
OD_IOP_MMHG: 09
OS_IOP_MMHG: 09

## 2023-03-08 ENCOUNTER — OFFICE VISIT (OUTPATIENT)
Facility: CLINIC | Age: 88
End: 2023-03-08
Payer: MEDICAID

## 2023-03-08 VITALS
SYSTOLIC BLOOD PRESSURE: 141 MMHG | WEIGHT: 123 LBS | BODY MASS INDEX: 27.67 KG/M2 | HEIGHT: 56 IN | HEART RATE: 79 BPM | OXYGEN SATURATION: 97 % | RESPIRATION RATE: 15 BRPM | DIASTOLIC BLOOD PRESSURE: 70 MMHG

## 2023-03-08 DIAGNOSIS — I10 ESSENTIAL (PRIMARY) HYPERTENSION: Primary | ICD-10-CM

## 2023-03-08 DIAGNOSIS — E78.5 HYPERLIPIDEMIA, UNSPECIFIED HYPERLIPIDEMIA TYPE: ICD-10-CM

## 2023-03-08 DIAGNOSIS — Z23 NEED FOR PNEUMOCOCCAL VACCINATION: ICD-10-CM

## 2023-03-08 DIAGNOSIS — N39.0 URINARY TRACT INFECTION WITHOUT HEMATURIA, SITE UNSPECIFIED: ICD-10-CM

## 2023-03-08 PROCEDURE — 90677 PCV20 VACCINE IM: CPT | Performed by: INTERNAL MEDICINE

## 2023-03-08 PROCEDURE — 1123F ACP DISCUSS/DSCN MKR DOCD: CPT | Performed by: INTERNAL MEDICINE

## 2023-03-08 PROCEDURE — 99214 OFFICE O/P EST MOD 30 MIN: CPT | Performed by: INTERNAL MEDICINE

## 2023-03-08 PROCEDURE — 90471 IMMUNIZATION ADMIN: CPT | Performed by: INTERNAL MEDICINE

## 2023-03-08 RX ORDER — AMLODIPINE BESYLATE 5 MG/1
5 TABLET ORAL DAILY
Qty: 90 TABLET | Refills: 1 | Status: SHIPPED | OUTPATIENT
Start: 2023-03-08

## 2023-03-08 SDOH — ECONOMIC STABILITY: FOOD INSECURITY: WITHIN THE PAST 12 MONTHS, THE FOOD YOU BOUGHT JUST DIDN'T LAST AND YOU DIDN'T HAVE MONEY TO GET MORE.: PATIENT DECLINED

## 2023-03-08 SDOH — ECONOMIC STABILITY: INCOME INSECURITY: HOW HARD IS IT FOR YOU TO PAY FOR THE VERY BASICS LIKE FOOD, HOUSING, MEDICAL CARE, AND HEATING?: PATIENT DECLINED

## 2023-03-08 SDOH — ECONOMIC STABILITY: HOUSING INSECURITY
IN THE LAST 12 MONTHS, WAS THERE A TIME WHEN YOU DID NOT HAVE A STEADY PLACE TO SLEEP OR SLEPT IN A SHELTER (INCLUDING NOW)?: NO

## 2023-03-08 SDOH — ECONOMIC STABILITY: FOOD INSECURITY: WITHIN THE PAST 12 MONTHS, YOU WORRIED THAT YOUR FOOD WOULD RUN OUT BEFORE YOU GOT MONEY TO BUY MORE.: PATIENT DECLINED

## 2023-03-08 ASSESSMENT — PATIENT HEALTH QUESTIONNAIRE - PHQ9
1. LITTLE INTEREST OR PLEASURE IN DOING THINGS: 0
SUM OF ALL RESPONSES TO PHQ QUESTIONS 1-9: 0
2. FEELING DOWN, DEPRESSED OR HOPELESS: 0
SUM OF ALL RESPONSES TO PHQ9 QUESTIONS 1 & 2: 0

## 2023-03-08 NOTE — PROGRESS NOTES
When asked if patient has any concerns he/she would like to address with Dr. Maye Wesley patient states Yes concerns about bruising and chest congestion         Did patient bring someone? Yes      Did the patient have DME equipment? No      Did you take your medication today? Yes         1. \"Have you been to the ER, urgent care clinic since your last visit? Hospitalized since your last visit? \" Patient states No      2. \"Have you seen or consulted any other health care providers outside of the [address] [place] [address] since your last visit? \" No      3. For patients aged 39-70: Has the patient had a colonoscopy / FIT/ Cologuard? N/A        If the patient is female:     4. For patients aged 41-77: Has the patient had a mammogram within the past 2 years? No, Yes, N/A        5. For patients aged 21-65: Has the patient had a pap smear? {Cancer Care Gap present?  Yes , No, N/A        PHQ-9  3/8/2023   Little interest or pleasure in doing things 0   Little interest or pleasure in doing things -   Feeling down, depressed, or hopeless 0   PHQ-2 Score 0   Total Score PHQ 2 -   PHQ-9 Total Score 0        Health Maintenance   Topic Date Due    DTaP/Tdap/Td vaccine (1 - Tdap) Never done    Shingles vaccine (1 of 2) Never done    Pneumococcal 65+ years Vaccine (2 - PPSV23 if available, else PCV20) 03/27/2020    COVID-19 Vaccine (4 - Booster for Pfizer series) 12/17/2021    Depression Screen  09/08/2023    Flu vaccine  Completed    Hepatitis A vaccine  Aged Out    Hib vaccine  Aged Out    Meningococcal (ACWY) vaccine  Aged Out     Health Maintenance Review  { healthmaintenance rooming patient:[id number]}
instructions.     Yancy Hunter MD  Internal Medicine  [facility]

## 2023-03-09 LAB
CHOLEST SERPL-MCNC: 228 MG/DL (ref 100–199)
ERYTHROCYTE [DISTWIDTH] IN BLOOD BY AUTOMATED COUNT: 16.1 % (ref 11.7–15.4)
HCT VFR BLD AUTO: 41.5 % (ref 34–46.6)
HDLC SERPL-MCNC: 71 MG/DL
HGB BLD-MCNC: 12.7 G/DL (ref 11.1–15.9)
LDLC SERPL CALC-MCNC: 127 MG/DL (ref 0–99)
MCH RBC QN AUTO: 22.9 PG (ref 26.6–33)
MCHC RBC AUTO-ENTMCNC: 30.6 G/DL (ref 31.5–35.7)
MCV RBC AUTO: 75 FL (ref 79–97)
PLATELET # BLD AUTO: 267 X10E3/UL (ref 150–450)
RBC # BLD AUTO: 5.55 X10E6/UL (ref 3.77–5.28)
TRIGL SERPL-MCNC: 170 MG/DL (ref 0–149)
VLDLC SERPL CALC-MCNC: 30 MG/DL (ref 5–40)
WBC # BLD AUTO: 5.4 X10E3/UL (ref 3.4–10.8)

## 2023-03-10 LAB
ALBUMIN SERPL-MCNC: 4.5 G/DL (ref 3.6–4.6)
ALBUMIN/GLOB SERPL: 1.5 {RATIO} (ref 1.2–2.2)
ALP SERPL-CCNC: 63 IU/L (ref 44–121)
ALT SERPL-CCNC: 20 IU/L (ref 0–32)
AST SERPL-CCNC: 29 IU/L (ref 0–40)
BACTERIA UR CULT: NORMAL
BILIRUB SERPL-MCNC: 0.4 MG/DL (ref 0–1.2)
BUN SERPL-MCNC: 9 MG/DL (ref 8–27)
BUN/CREAT SERPL: 13 (ref 12–28)
CALCIUM SERPL-MCNC: 9.3 MG/DL (ref 8.7–10.3)
CHLORIDE SERPL-SCNC: 103 MMOL/L (ref 96–106)
CO2 SERPL-SCNC: 20 MMOL/L (ref 20–29)
CREAT SERPL-MCNC: 0.68 MG/DL (ref 0.57–1)
EGFRCR SERPLBLD CKD-EPI 2021: 83 ML/MIN/1.73
GLOBULIN SER CALC-MCNC: 3 G/DL (ref 1.5–4.5)
GLUCOSE SERPL-MCNC: 105 MG/DL (ref 70–99)
POTASSIUM SERPL-SCNC: 3.8 MMOL/L (ref 3.5–5.2)
PROT SERPL-MCNC: 7.5 G/DL (ref 6–8.5)
SODIUM SERPL-SCNC: 143 MMOL/L (ref 134–144)

## 2023-06-06 ENCOUNTER — FOLLOW UP (OUTPATIENT)
Dept: URBAN - METROPOLITAN AREA CLINIC 1 | Facility: CLINIC | Age: 88
End: 2023-06-06

## 2023-06-06 DIAGNOSIS — H16.143: ICD-10-CM

## 2023-06-06 DIAGNOSIS — H43.22: ICD-10-CM

## 2023-06-06 DIAGNOSIS — H26.493: ICD-10-CM

## 2023-06-06 DIAGNOSIS — H04.123: ICD-10-CM

## 2023-06-06 DIAGNOSIS — H35.033: ICD-10-CM

## 2023-06-06 DIAGNOSIS — H35.3132: ICD-10-CM

## 2023-06-06 PROCEDURE — 99214 OFFICE O/P EST MOD 30 MIN: CPT

## 2023-06-06 ASSESSMENT — VISUAL ACUITY
OS_SC: 20/25
OS_BAT: 20/50
OD_BAT: 20/50
OD_SC: 20/25

## 2023-06-06 ASSESSMENT — TONOMETRY
OS_IOP_MMHG: 14
OD_IOP_MMHG: 14

## 2023-06-16 ENCOUNTER — CLINIC PROCEDURE ONLY (OUTPATIENT)
Dept: URBAN - METROPOLITAN AREA CLINIC 1 | Facility: CLINIC | Age: 88
End: 2023-06-16

## 2023-06-16 DIAGNOSIS — Z96.1: ICD-10-CM

## 2023-06-16 DIAGNOSIS — H26.493: ICD-10-CM

## 2023-06-16 PROCEDURE — 66821 AFTER CATARACT LASER SURGERY: CPT

## 2023-06-30 ENCOUNTER — CLINIC PROCEDURE ONLY (OUTPATIENT)
Dept: URBAN - METROPOLITAN AREA CLINIC 1 | Facility: CLINIC | Age: 88
End: 2023-06-30

## 2023-07-11 ENCOUNTER — POST-OP (OUTPATIENT)
Dept: URBAN - METROPOLITAN AREA CLINIC 1 | Facility: CLINIC | Age: 88
End: 2023-07-11

## 2023-07-11 DIAGNOSIS — Z96.1: ICD-10-CM

## 2023-07-11 DIAGNOSIS — Z98.890: ICD-10-CM

## 2023-07-11 PROCEDURE — 99024 POSTOP FOLLOW-UP VISIT: CPT

## 2023-07-11 ASSESSMENT — VISUAL ACUITY
OS_SC: 20/25-1
OD_SC: 20/25+1

## 2023-07-11 ASSESSMENT — TONOMETRY
OD_IOP_MMHG: 14
OS_IOP_MMHG: 14

## 2023-09-11 ENCOUNTER — OFFICE VISIT (OUTPATIENT)
Facility: CLINIC | Age: 88
End: 2023-09-11
Payer: MEDICAID

## 2023-09-11 VITALS
TEMPERATURE: 98.2 F | SYSTOLIC BLOOD PRESSURE: 139 MMHG | BODY MASS INDEX: 27.44 KG/M2 | RESPIRATION RATE: 14 BRPM | OXYGEN SATURATION: 97 % | WEIGHT: 122 LBS | HEIGHT: 56 IN | DIASTOLIC BLOOD PRESSURE: 68 MMHG | HEART RATE: 79 BPM

## 2023-09-11 DIAGNOSIS — I10 ESSENTIAL (PRIMARY) HYPERTENSION: Primary | ICD-10-CM

## 2023-09-11 DIAGNOSIS — E78.5 HYPERLIPIDEMIA, UNSPECIFIED HYPERLIPIDEMIA TYPE: ICD-10-CM

## 2023-09-11 DIAGNOSIS — R10.13 EPIGASTRIC PAIN: ICD-10-CM

## 2023-09-11 PROCEDURE — 1123F ACP DISCUSS/DSCN MKR DOCD: CPT | Performed by: INTERNAL MEDICINE

## 2023-09-11 PROCEDURE — 99214 OFFICE O/P EST MOD 30 MIN: CPT | Performed by: INTERNAL MEDICINE

## 2023-09-11 RX ORDER — OMEPRAZOLE 20 MG/1
20 CAPSULE, DELAYED RELEASE ORAL
Qty: 90 CAPSULE | Refills: 0 | Status: SHIPPED | OUTPATIENT
Start: 2023-09-11

## 2023-09-11 ASSESSMENT — PATIENT HEALTH QUESTIONNAIRE - PHQ9
SUM OF ALL RESPONSES TO PHQ QUESTIONS 1-9: 0
1. LITTLE INTEREST OR PLEASURE IN DOING THINGS: 0
2. FEELING DOWN, DEPRESSED OR HOPELESS: 0
SUM OF ALL RESPONSES TO PHQ9 QUESTIONS 1 & 2: 0
SUM OF ALL RESPONSES TO PHQ QUESTIONS 1-9: 0

## 2023-09-11 NOTE — PROGRESS NOTES
1. \"Have you been to the ER, urgent care clinic since your last visit? Hospitalized since your last visit? \" No    2. \"Have you seen or consulted any other health care providers outside of the [address] [place] [address] since your last visit? \" No    3. For patients aged 43-73: Has the patient had a colonoscopy / FIT/ Cologuard? N/A      If the patient is female:    4. For patients aged 43-66: Has the patient had a mammogram within the past 2 years? NA - based on age or sex    11. For patients aged 21-65: Has the patient had a pap smear?  NA - based on age or sex    Health Maintenance Due   Topic Date Due    DTaP/Tdap/Td vaccine (1 - Tdap) Never done    Shingles vaccine (1 of 2) Never done    COVID-19 Vaccine (4 - Pfizer series) 12/17/2021    Flu vaccine (1) 08/01/2023

## 2023-09-12 LAB
ALBUMIN SERPL-MCNC: 4.5 G/DL (ref 3.6–4.6)
ALBUMIN/GLOB SERPL: 1.7 {RATIO} (ref 1.2–2.2)
ALP SERPL-CCNC: 67 IU/L (ref 44–121)
ALT SERPL-CCNC: 23 IU/L (ref 0–32)
AST SERPL-CCNC: 27 IU/L (ref 0–40)
BILIRUB SERPL-MCNC: 0.3 MG/DL (ref 0–1.2)
BUN SERPL-MCNC: 11 MG/DL (ref 10–36)
BUN/CREAT SERPL: 18 (ref 12–28)
CALCIUM SERPL-MCNC: 9.3 MG/DL (ref 8.7–10.3)
CHLORIDE SERPL-SCNC: 103 MMOL/L (ref 96–106)
CHOLEST SERPL-MCNC: 225 MG/DL (ref 100–199)
CO2 SERPL-SCNC: 25 MMOL/L (ref 20–29)
CREAT SERPL-MCNC: 0.6 MG/DL (ref 0.57–1)
EGFRCR SERPLBLD CKD-EPI 2021: 85 ML/MIN/1.73
ERYTHROCYTE [DISTWIDTH] IN BLOOD BY AUTOMATED COUNT: 16.3 % (ref 11.7–15.4)
GLOBULIN SER CALC-MCNC: 2.7 G/DL (ref 1.5–4.5)
GLUCOSE SERPL-MCNC: 100 MG/DL (ref 70–99)
HCT VFR BLD AUTO: 40.6 % (ref 34–46.6)
HDLC SERPL-MCNC: 68 MG/DL
HGB BLD-MCNC: 12.4 G/DL (ref 11.1–15.9)
LDLC SERPL CALC-MCNC: 124 MG/DL (ref 0–99)
MCH RBC QN AUTO: 23 PG (ref 26.6–33)
MCHC RBC AUTO-ENTMCNC: 30.5 G/DL (ref 31.5–35.7)
MCV RBC AUTO: 76 FL (ref 79–97)
PLATELET # BLD AUTO: 253 X10E3/UL (ref 150–450)
POTASSIUM SERPL-SCNC: 4 MMOL/L (ref 3.5–5.2)
PROT SERPL-MCNC: 7.2 G/DL (ref 6–8.5)
RBC # BLD AUTO: 5.38 X10E6/UL (ref 3.77–5.28)
SODIUM SERPL-SCNC: 142 MMOL/L (ref 134–144)
TRIGL SERPL-MCNC: 187 MG/DL (ref 0–149)
VLDLC SERPL CALC-MCNC: 33 MG/DL (ref 5–40)
WBC # BLD AUTO: 5.3 X10E3/UL (ref 3.4–10.8)

## 2023-09-13 NOTE — PROGRESS NOTES
Assessment/ Plan:   Ovidio Seip was seen today for follow-up chronic condition and feels full all of the time. Diagnoses and all orders for this visit:    Essential (primary) hypertension-controlled, continue with present meds  -     Comprehensive Metabolic Panel  -     CBC    Hyperlipidemia, unspecified hyperlipidemia type-continue to watch diet as she si off statin bec of advanced age  -     Lipid Panel    Epigastric pain-advised to try to identify foods causing her to have the pain and avoid these; will have her try PPI daily for 6 weeks then prn  -     omeprazole (PRILOSEC) 20 MG delayed release capsule; Take 1 capsule by mouth every morning (before breakfast)  -     CBC      Daughter given Adv Directive forms  to fill out    Follow-up and Dispositions    Return in about 6 months (around 3/11/2024) for follow up. Chief Complaint   Patient presents with    Follow-up Chronic Condition     6 month    feels full all of the time              Pt is a 80y.o. year old female who presents for follow up of her chronic medical problems    Health Maintenance Due   Topic Date Due    DTaP/Tdap/Td vaccine (1 - Tdap) Never done    Shingles vaccine (1 of 2) Never done    COVID-19 Vaccine (4 - Pfizer series) 12/17/2021    Flu vaccine (1) 08/01/2023        Just celebrated her 89th birthday!     BP Readings from Last 3 Encounters:   09/11/23 139/68   03/08/23 (!) 141/70   09/08/22 139/66        Wt Readings from Last 3 Encounters:   09/11/23 122 lb (55.3 kg)   03/08/23 123 lb (55.8 kg)   09/08/22 119 lb (54 kg)        Lab Results   Component Value Date/Time    CHOL 225 09/11/2023 12:00 PM    CHOL 233 09/08/2022 12:23 PM    TRIG 187 09/11/2023 12:00 PM    HDL 68 09/11/2023 12:00 PM    LDLCALC 124 09/11/2023 12:00 PM        Currently with intermittent epigastric pain  She and daughter agreed though not to do any imaging or referral at this time  ROS:    Pt denies: Wt loss, Fever/Chills, HA, Visual changes, Fatigue,

## 2023-10-14 DIAGNOSIS — R10.13 EPIGASTRIC PAIN: ICD-10-CM

## 2023-10-16 RX ORDER — OMEPRAZOLE 20 MG/1
20 CAPSULE, DELAYED RELEASE ORAL
Qty: 90 CAPSULE | Refills: 1 | Status: SHIPPED | OUTPATIENT
Start: 2023-10-16

## 2023-10-23 DIAGNOSIS — I10 ESSENTIAL (PRIMARY) HYPERTENSION: ICD-10-CM

## 2023-10-23 RX ORDER — AMLODIPINE BESYLATE 5 MG/1
5 TABLET ORAL DAILY
Qty: 90 TABLET | Refills: 0 | Status: SHIPPED | OUTPATIENT
Start: 2023-10-23

## 2024-01-28 DIAGNOSIS — I10 ESSENTIAL (PRIMARY) HYPERTENSION: ICD-10-CM

## 2024-01-29 RX ORDER — AMLODIPINE BESYLATE 5 MG/1
5 TABLET ORAL DAILY
Qty: 90 TABLET | Refills: 0 | Status: SHIPPED | OUTPATIENT
Start: 2024-01-29

## 2024-03-18 ENCOUNTER — OFFICE VISIT (OUTPATIENT)
Facility: CLINIC | Age: 89
End: 2024-03-18
Payer: MEDICAID

## 2024-03-18 VITALS
TEMPERATURE: 98.3 F | DIASTOLIC BLOOD PRESSURE: 74 MMHG | HEIGHT: 56 IN | SYSTOLIC BLOOD PRESSURE: 125 MMHG | OXYGEN SATURATION: 96 % | WEIGHT: 113.2 LBS | HEART RATE: 84 BPM | BODY MASS INDEX: 25.47 KG/M2 | RESPIRATION RATE: 16 BRPM

## 2024-03-18 DIAGNOSIS — I10 ESSENTIAL (PRIMARY) HYPERTENSION: Primary | ICD-10-CM

## 2024-03-18 DIAGNOSIS — R59.0 POSTERIOR CERVICAL LYMPHADENOPATHY: ICD-10-CM

## 2024-03-18 DIAGNOSIS — E78.5 HYPERLIPIDEMIA, UNSPECIFIED HYPERLIPIDEMIA TYPE: ICD-10-CM

## 2024-03-18 PROCEDURE — 99214 OFFICE O/P EST MOD 30 MIN: CPT | Performed by: INTERNAL MEDICINE

## 2024-03-18 PROCEDURE — 1123F ACP DISCUSS/DSCN MKR DOCD: CPT | Performed by: INTERNAL MEDICINE

## 2024-03-18 ASSESSMENT — PATIENT HEALTH QUESTIONNAIRE - PHQ9
SUM OF ALL RESPONSES TO PHQ QUESTIONS 1-9: 0
1. LITTLE INTEREST OR PLEASURE IN DOING THINGS: NOT AT ALL
2. FEELING DOWN, DEPRESSED OR HOPELESS: NOT AT ALL
SUM OF ALL RESPONSES TO PHQ QUESTIONS 1-9: 0
SUM OF ALL RESPONSES TO PHQ QUESTIONS 1-9: 0
SUM OF ALL RESPONSES TO PHQ9 QUESTIONS 1 & 2: 0
SUM OF ALL RESPONSES TO PHQ QUESTIONS 1-9: 0

## 2024-03-18 NOTE — PROGRESS NOTES
\"Have you been to the ER, urgent care clinic since your last visit?  Hospitalized since your last visit?\"    Yes- [facility] 2/2024 for upper respiratory infection.     “Have you seen or consulted any other health care providers outside of [facility] since your last visit?”    NO            Click Here for Release of Records Request  
verbalized understanding of above instructions.    Tanika Kuntson MD  Internal Medicine  [facility]

## 2024-03-19 LAB
ALBUMIN SERPL-MCNC: 4.2 G/DL (ref 3.6–4.6)
ALBUMIN/GLOB SERPL: 1.4 {RATIO} (ref 1.2–2.2)
ALP SERPL-CCNC: 74 IU/L (ref 44–121)
ALT SERPL-CCNC: 20 IU/L (ref 0–32)
AST SERPL-CCNC: 29 IU/L (ref 0–40)
BILIRUB SERPL-MCNC: 0.2 MG/DL (ref 0–1.2)
BUN SERPL-MCNC: 15 MG/DL (ref 10–36)
BUN/CREAT SERPL: 26 (ref 12–28)
CALCIUM SERPL-MCNC: 9.1 MG/DL (ref 8.7–10.3)
CHLORIDE SERPL-SCNC: 104 MMOL/L (ref 96–106)
CHOLEST SERPL-MCNC: 219 MG/DL (ref 100–199)
CO2 SERPL-SCNC: 24 MMOL/L (ref 20–29)
CREAT SERPL-MCNC: 0.57 MG/DL (ref 0.57–1)
EGFRCR SERPLBLD CKD-EPI 2021: 86 ML/MIN/1.73
ERYTHROCYTE [DISTWIDTH] IN BLOOD BY AUTOMATED COUNT: 14.7 % (ref 11.7–15.4)
GLOBULIN SER CALC-MCNC: 3 G/DL (ref 1.5–4.5)
GLUCOSE SERPL-MCNC: 120 MG/DL (ref 70–99)
HCT VFR BLD AUTO: 40.2 % (ref 34–46.6)
HDLC SERPL-MCNC: 75 MG/DL
HGB BLD-MCNC: 12.2 G/DL (ref 11.1–15.9)
LDLC SERPL CALC-MCNC: 113 MG/DL (ref 0–99)
MCH RBC QN AUTO: 22.8 PG (ref 26.6–33)
MCHC RBC AUTO-ENTMCNC: 30.3 G/DL (ref 31.5–35.7)
MCV RBC AUTO: 75 FL (ref 79–97)
PLATELET # BLD AUTO: 254 X10E3/UL (ref 150–450)
POTASSIUM SERPL-SCNC: 3.7 MMOL/L (ref 3.5–5.2)
PROT SERPL-MCNC: 7.2 G/DL (ref 6–8.5)
RBC # BLD AUTO: 5.35 X10E6/UL (ref 3.77–5.28)
SODIUM SERPL-SCNC: 143 MMOL/L (ref 134–144)
TRIGL SERPL-MCNC: 184 MG/DL (ref 0–149)
VLDLC SERPL CALC-MCNC: 31 MG/DL (ref 5–40)
WBC # BLD AUTO: 5.8 X10E3/UL (ref 3.4–10.8)

## 2024-03-28 ENCOUNTER — TELEPHONE (OUTPATIENT)
Facility: CLINIC | Age: 89
End: 2024-03-28

## 2024-03-28 NOTE — TELEPHONE ENCOUNTER
Ms. Morris with [facility] is following up on a form they faxed over on 3/18, for supplies for the patient    [facility] can be reached at [phone], fax [phone].

## 2024-04-24 DIAGNOSIS — I10 ESSENTIAL (PRIMARY) HYPERTENSION: ICD-10-CM

## 2024-04-25 ENCOUNTER — TELEPHONE (OUTPATIENT)
Facility: CLINIC | Age: 89
End: 2024-04-25

## 2024-04-25 NOTE — TELEPHONE ENCOUNTER
[facility]- they sated they mailed over on the 11, certificate of medical necessity and wanted to confirm that we have received it.

## 2024-04-29 RX ORDER — AMLODIPINE BESYLATE 5 MG/1
5 TABLET ORAL DAILY
Qty: 90 TABLET | Refills: 0 | Status: SHIPPED | OUTPATIENT
Start: 2024-04-29

## 2024-05-01 ENCOUNTER — TELEPHONE (OUTPATIENT)
Facility: CLINIC | Age: 89
End: 2024-05-01

## 2024-05-01 NOTE — TELEPHONE ENCOUNTER
[facility]: Mailed a Certificate of Nutritional Supplies to the office on 4/11 and wanted to know if we have received, [facility] disconnected the call before I could get a call back number

## 2024-05-02 ENCOUNTER — TELEPHONE (OUTPATIENT)
Facility: CLINIC | Age: 89
End: 2024-05-02

## 2024-05-02 NOTE — TELEPHONE ENCOUNTER
Elizabeth- [facility] called to see if we got the certificate of nutritional supplies that was mailed on 4/11.  Fax number: [phone].  Please fax or call them back to confirm that we received.  Call back number: [phone]

## 2024-05-03 ENCOUNTER — TELEPHONE (OUTPATIENT)
Facility: CLINIC | Age: 89
End: 2024-05-03

## 2024-05-03 NOTE — TELEPHONE ENCOUNTER
Mailed certificate of necessity of medical supplies, rep from home care [facility] is trying to check the status of the letter. Rep stated it was mailed out April 11/2024.   Contact : [phone]    Fax: [phone] for letter to filled out and sent it

## 2024-07-27 DIAGNOSIS — I10 ESSENTIAL (PRIMARY) HYPERTENSION: ICD-10-CM

## 2024-07-29 RX ORDER — AMLODIPINE BESYLATE 5 MG/1
5 TABLET ORAL DAILY
Qty: 90 TABLET | Refills: 1 | Status: SHIPPED | OUTPATIENT
Start: 2024-07-29

## 2024-09-23 ENCOUNTER — OFFICE VISIT (OUTPATIENT)
Facility: CLINIC | Age: 89
End: 2024-09-23
Payer: MEDICAID

## 2024-09-23 VITALS
OXYGEN SATURATION: 94 % | BODY MASS INDEX: 25.6 KG/M2 | SYSTOLIC BLOOD PRESSURE: 139 MMHG | WEIGHT: 113.8 LBS | HEIGHT: 56 IN | HEART RATE: 70 BPM | RESPIRATION RATE: 16 BRPM | DIASTOLIC BLOOD PRESSURE: 66 MMHG | TEMPERATURE: 98.2 F

## 2024-09-23 DIAGNOSIS — R59.0 POSTERIOR CERVICAL LYMPHADENOPATHY: ICD-10-CM

## 2024-09-23 DIAGNOSIS — E78.5 HYPERLIPIDEMIA, UNSPECIFIED HYPERLIPIDEMIA TYPE: ICD-10-CM

## 2024-09-23 DIAGNOSIS — I10 ESSENTIAL (PRIMARY) HYPERTENSION: Primary | ICD-10-CM

## 2024-09-23 DIAGNOSIS — R09.89 PHLEGM IN THROAT: ICD-10-CM

## 2024-09-23 DIAGNOSIS — I10 ESSENTIAL (PRIMARY) HYPERTENSION: ICD-10-CM

## 2024-09-23 PROCEDURE — 99214 OFFICE O/P EST MOD 30 MIN: CPT | Performed by: INTERNAL MEDICINE

## 2024-09-23 PROCEDURE — 1123F ACP DISCUSS/DSCN MKR DOCD: CPT | Performed by: INTERNAL MEDICINE

## 2024-09-23 RX ORDER — GUAIFENESIN 600 MG/1
600 TABLET, EXTENDED RELEASE ORAL 2 TIMES DAILY PRN
Qty: 30 TABLET | Refills: 0 | Status: SHIPPED | OUTPATIENT
Start: 2024-09-23 | End: 2024-10-08

## 2024-09-23 SDOH — ECONOMIC STABILITY: FOOD INSECURITY: WITHIN THE PAST 12 MONTHS, YOU WORRIED THAT YOUR FOOD WOULD RUN OUT BEFORE YOU GOT MONEY TO BUY MORE.: NEVER TRUE

## 2024-09-23 SDOH — ECONOMIC STABILITY: FOOD INSECURITY: WITHIN THE PAST 12 MONTHS, THE FOOD YOU BOUGHT JUST DIDN'T LAST AND YOU DIDN'T HAVE MONEY TO GET MORE.: NEVER TRUE

## 2024-09-23 SDOH — ECONOMIC STABILITY: INCOME INSECURITY: HOW HARD IS IT FOR YOU TO PAY FOR THE VERY BASICS LIKE FOOD, HOUSING, MEDICAL CARE, AND HEATING?: NOT HARD AT ALL

## 2024-09-23 ASSESSMENT — PATIENT HEALTH QUESTIONNAIRE - PHQ9
SUM OF ALL RESPONSES TO PHQ QUESTIONS 1-9: 0
SUM OF ALL RESPONSES TO PHQ QUESTIONS 1-9: 0
SUM OF ALL RESPONSES TO PHQ9 QUESTIONS 1 & 2: 0
SUM OF ALL RESPONSES TO PHQ QUESTIONS 1-9: 0
SUM OF ALL RESPONSES TO PHQ QUESTIONS 1-9: 0
1. LITTLE INTEREST OR PLEASURE IN DOING THINGS: NOT AT ALL
2. FEELING DOWN, DEPRESSED OR HOPELESS: NOT AT ALL

## 2025-01-28 ENCOUNTER — OFFICE VISIT (OUTPATIENT)
Facility: CLINIC | Age: 89
End: 2025-01-28
Payer: MEDICAID

## 2025-01-28 VITALS
WEIGHT: 117.8 LBS | SYSTOLIC BLOOD PRESSURE: 119 MMHG | HEART RATE: 80 BPM | TEMPERATURE: 97.8 F | DIASTOLIC BLOOD PRESSURE: 63 MMHG | OXYGEN SATURATION: 96 % | HEIGHT: 56 IN | RESPIRATION RATE: 14 BRPM | BODY MASS INDEX: 26.5 KG/M2

## 2025-01-28 DIAGNOSIS — R30.0 DYSURIA: ICD-10-CM

## 2025-01-28 DIAGNOSIS — R59.0 POSTERIOR CERVICAL LYMPHADENOPATHY: ICD-10-CM

## 2025-01-28 DIAGNOSIS — I10 ESSENTIAL (PRIMARY) HYPERTENSION: Primary | ICD-10-CM

## 2025-01-28 DIAGNOSIS — E78.5 HYPERLIPIDEMIA, UNSPECIFIED HYPERLIPIDEMIA TYPE: ICD-10-CM

## 2025-01-28 PROCEDURE — 99214 OFFICE O/P EST MOD 30 MIN: CPT | Performed by: INTERNAL MEDICINE

## 2025-01-28 PROCEDURE — 1123F ACP DISCUSS/DSCN MKR DOCD: CPT | Performed by: INTERNAL MEDICINE

## 2025-01-28 RX ORDER — AMLODIPINE BESYLATE 5 MG/1
5 TABLET ORAL DAILY
Qty: 90 TABLET | Refills: 1 | Status: SHIPPED | OUTPATIENT
Start: 2025-01-28

## 2025-01-28 RX ORDER — CALCIUM CARBONATE/VITAMIN D3 600 MG-10
1 TABLET ORAL DAILY
Status: CANCELLED | OUTPATIENT
Start: 2025-01-28

## 2025-01-28 SDOH — ECONOMIC STABILITY: FOOD INSECURITY: WITHIN THE PAST 12 MONTHS, THE FOOD YOU BOUGHT JUST DIDN'T LAST AND YOU DIDN'T HAVE MONEY TO GET MORE.: NEVER TRUE

## 2025-01-28 SDOH — ECONOMIC STABILITY: FOOD INSECURITY: WITHIN THE PAST 12 MONTHS, YOU WORRIED THAT YOUR FOOD WOULD RUN OUT BEFORE YOU GOT MONEY TO BUY MORE.: NEVER TRUE

## 2025-01-28 ASSESSMENT — PATIENT HEALTH QUESTIONNAIRE - PHQ9
SUM OF ALL RESPONSES TO PHQ QUESTIONS 1-9: 0
SUM OF ALL RESPONSES TO PHQ9 QUESTIONS 1 & 2: 0
SUM OF ALL RESPONSES TO PHQ QUESTIONS 1-9: 0
1. LITTLE INTEREST OR PLEASURE IN DOING THINGS: NOT AT ALL
SUM OF ALL RESPONSES TO PHQ QUESTIONS 1-9: 0
SUM OF ALL RESPONSES TO PHQ QUESTIONS 1-9: 0
2. FEELING DOWN, DEPRESSED OR HOPELESS: NOT AT ALL

## 2025-01-28 NOTE — PROGRESS NOTES
\"Have you been to the ER, urgent care clinic since your last visit?  Hospitalized since your last visit?\"    NO    “Have you seen or consulted any other health care providers outside our system since your last visit?”    NO            
    Medication Side Effects and Warnings were discussed with patient: yes    Patient verbalized understanding of above instructions.    Tanika Knutson MD  Internal Medicine  [facility]

## 2025-01-29 LAB
ALBUMIN SERPL-MCNC: 4 G/DL (ref 3.6–4.6)
ALP SERPL-CCNC: 60 IU/L (ref 44–121)
ALT SERPL-CCNC: 16 IU/L (ref 0–32)
AST SERPL-CCNC: 26 IU/L (ref 0–40)
BILIRUB SERPL-MCNC: 0.3 MG/DL (ref 0–1.2)
BUN SERPL-MCNC: 13 MG/DL (ref 10–36)
BUN/CREAT SERPL: 20 (ref 12–28)
CALCIUM SERPL-MCNC: 9 MG/DL (ref 8.7–10.3)
CHLORIDE SERPL-SCNC: 104 MMOL/L (ref 96–106)
CHOLEST SERPL-MCNC: 221 MG/DL (ref 100–199)
CO2 SERPL-SCNC: 22 MMOL/L (ref 20–29)
CREAT SERPL-MCNC: 0.66 MG/DL (ref 0.57–1)
EGFRCR SERPLBLD CKD-EPI 2021: 83 ML/MIN/1.73
ERYTHROCYTE [DISTWIDTH] IN BLOOD BY AUTOMATED COUNT: 15.7 % (ref 11.7–15.4)
GLOBULIN SER CALC-MCNC: 3 G/DL (ref 1.5–4.5)
GLUCOSE SERPL-MCNC: 100 MG/DL (ref 70–99)
HCT VFR BLD AUTO: 39.9 % (ref 34–46.6)
HDLC SERPL-MCNC: 71 MG/DL
HGB BLD-MCNC: 11.7 G/DL (ref 11.1–15.9)
LDLC SERPL CALC-MCNC: 123 MG/DL (ref 0–99)
MCH RBC QN AUTO: 22.3 PG (ref 26.6–33)
MCHC RBC AUTO-ENTMCNC: 29.3 G/DL (ref 31.5–35.7)
MCV RBC AUTO: 76 FL (ref 79–97)
PLATELET # BLD AUTO: 290 X10E3/UL (ref 150–450)
POTASSIUM SERPL-SCNC: 3.9 MMOL/L (ref 3.5–5.2)
PROT SERPL-MCNC: 7 G/DL (ref 6–8.5)
RBC # BLD AUTO: 5.25 X10E6/UL (ref 3.77–5.28)
SODIUM SERPL-SCNC: 143 MMOL/L (ref 134–144)
TRIGL SERPL-MCNC: 157 MG/DL (ref 0–149)
VLDLC SERPL CALC-MCNC: 27 MG/DL (ref 5–40)
WBC # BLD AUTO: 5.1 X10E3/UL (ref 3.4–10.8)

## 2025-01-31 DIAGNOSIS — N39.0 URINARY TRACT INFECTION WITHOUT HEMATURIA, SITE UNSPECIFIED: Primary | ICD-10-CM

## 2025-01-31 LAB — BACTERIA UR CULT: ABNORMAL

## 2025-01-31 RX ORDER — SULFAMETHOXAZOLE AND TRIMETHOPRIM 800; 160 MG/1; MG/1
1 TABLET ORAL 2 TIMES DAILY
Qty: 10 TABLET | Refills: 0 | Status: SHIPPED | OUTPATIENT
Start: 2025-01-31 | End: 2025-02-05

## 2025-02-15 DIAGNOSIS — R09.89 PHLEGM IN THROAT: ICD-10-CM

## 2025-02-17 RX ORDER — GUAIFENESIN 600 MG/1
600 TABLET, EXTENDED RELEASE ORAL 2 TIMES DAILY PRN
Qty: 30 TABLET | Refills: 0 | Status: SHIPPED | OUTPATIENT
Start: 2025-02-17 | End: 2025-03-04

## 2025-04-28 DIAGNOSIS — I10 ESSENTIAL (PRIMARY) HYPERTENSION: ICD-10-CM

## 2025-04-28 DIAGNOSIS — N39.0 URINARY TRACT INFECTION WITHOUT HEMATURIA, SITE UNSPECIFIED: Primary | ICD-10-CM

## 2025-04-28 RX ORDER — SULFAMETHOXAZOLE AND TRIMETHOPRIM 800; 160 MG/1; MG/1
1 TABLET ORAL 2 TIMES DAILY
Qty: 14 TABLET | Refills: 0 | Status: SHIPPED | OUTPATIENT
Start: 2025-04-28 | End: 2025-05-05

## 2025-04-28 RX ORDER — AMLODIPINE BESYLATE 5 MG/1
5 TABLET ORAL DAILY
Qty: 90 TABLET | Refills: 0 | Status: SHIPPED | OUTPATIENT
Start: 2025-04-28

## 2025-06-16 ENCOUNTER — TELEPHONE (OUTPATIENT)
Facility: CLINIC | Age: 89
End: 2025-06-16

## 2025-06-16 NOTE — TELEPHONE ENCOUNTER
----- Message from Zaria NORMAN sent at 6/16/2025  8:36 AM EDT -----  Regarding: [facility] Message to Provider  E[facility] Message to Provider    Relationship to Patient: Covered Entity [facility]    Additional Information  The caller received the certificate of medical necessity back on June 3rd for the patient's nutritional supplies however they do need the provider to update the form it looks like in section 2 the document questions no.8 A, B and C you need to be answered, as well as in section 2 question no. 1, 2 and 3 to the right of the form. And they also  need them to sign the 2nd page of the document as well.  --------------------------------------------------------------------------------------------------------------------------    Call Back Information: OK to leave message on voicemail  Preferred Call Back Number: Phone [phone]

## 2025-07-24 DIAGNOSIS — I10 ESSENTIAL (PRIMARY) HYPERTENSION: ICD-10-CM

## 2025-07-24 RX ORDER — AMLODIPINE BESYLATE 5 MG/1
TABLET ORAL
Qty: 90 TABLET | Refills: 1 | Status: SHIPPED | OUTPATIENT
Start: 2025-07-24

## 2025-07-29 ENCOUNTER — OFFICE VISIT (OUTPATIENT)
Facility: CLINIC | Age: 89
End: 2025-07-29
Payer: MEDICAID

## 2025-07-29 VITALS
DIASTOLIC BLOOD PRESSURE: 70 MMHG | SYSTOLIC BLOOD PRESSURE: 112 MMHG | BODY MASS INDEX: 25.64 KG/M2 | HEIGHT: 56 IN | RESPIRATION RATE: 14 BRPM | WEIGHT: 114 LBS | OXYGEN SATURATION: 98 % | HEART RATE: 93 BPM | TEMPERATURE: 98.7 F

## 2025-07-29 DIAGNOSIS — E55.9 VITAMIN D DEFICIENCY: ICD-10-CM

## 2025-07-29 DIAGNOSIS — H91.93 BILATERAL HEARING LOSS, UNSPECIFIED HEARING LOSS TYPE: ICD-10-CM

## 2025-07-29 DIAGNOSIS — Z13.1 SCREENING FOR DIABETES MELLITUS: ICD-10-CM

## 2025-07-29 DIAGNOSIS — M81.0 OSTEOPOROSIS WITHOUT CURRENT PATHOLOGICAL FRACTURE, UNSPECIFIED OSTEOPOROSIS TYPE: ICD-10-CM

## 2025-07-29 DIAGNOSIS — I10 ESSENTIAL (PRIMARY) HYPERTENSION: Primary | ICD-10-CM

## 2025-07-29 DIAGNOSIS — E78.5 HYPERLIPIDEMIA, UNSPECIFIED HYPERLIPIDEMIA TYPE: ICD-10-CM

## 2025-07-29 PROCEDURE — 1123F ACP DISCUSS/DSCN MKR DOCD: CPT | Performed by: INTERNAL MEDICINE

## 2025-07-29 PROCEDURE — 99214 OFFICE O/P EST MOD 30 MIN: CPT | Performed by: INTERNAL MEDICINE

## 2025-07-29 RX ORDER — AMLODIPINE BESYLATE 5 MG/1
5 TABLET ORAL DAILY
Qty: 90 TABLET | Refills: 1 | Status: SHIPPED | OUTPATIENT
Start: 2025-07-29

## 2025-07-29 ASSESSMENT — PATIENT HEALTH QUESTIONNAIRE - PHQ9
SUM OF ALL RESPONSES TO PHQ QUESTIONS 1-9: 0
1. LITTLE INTEREST OR PLEASURE IN DOING THINGS: NOT AT ALL
SUM OF ALL RESPONSES TO PHQ QUESTIONS 1-9: 0
2. FEELING DOWN, DEPRESSED OR HOPELESS: NOT AT ALL
SUM OF ALL RESPONSES TO PHQ QUESTIONS 1-9: 0
SUM OF ALL RESPONSES TO PHQ QUESTIONS 1-9: 0

## 2025-07-29 NOTE — PROGRESS NOTES
Assessment/ Plan:   Jen was seen today for follow-up chronic condition.    Diagnoses and all orders for this visit:    Essential (primary) hypertension-controlled, continue with Norvasc  -     amLODIPine (NORVASC) 5 MG tablet; Take 1 tablet by mouth daily  -     CBC; Future  -     Comprehensive Metabolic Panel; Future      Hyperlipidemia, unspecified hyperlipidemia type-declines to take statin, advised on low fat diet  -     Lipid Panel; Future  l    Bilateral hearing loss, unspecified hearing loss type-has hearing aids but does not like to use these; encouraged to use these    Osteoporosis without current pathological fracture, unspecified osteoporosis type-declines tx; encouraged to continue with walking for exercise ; take Ca+d daily  -     Vitamin D 25 Hydroxy; Future      Screening for diabetes mellitus  -     Hemoglobin A1C; Future        Assessment & Plan  1. Hypertension.  - Blood pressure readings have been consistently within the normal range, with today's reading at 112/70.  - Weight has remained stable, with a slight loss of 3 pounds since the last visit.  - Medication effectiveness is confirmed, and a prescription refill for amlodipine has been provided.  - Continued monitoring of blood pressure and weight is recommended.    2. Urinary Tract Infection (UTI).  - Reports indicate improvement in UTI symptoms.  - No new symptoms or complications have been noted.  - Previous treatment appears effective.  - No further intervention required at this time.    3. Cold.  - Reports indicate that the cough from a recent cold is improving.  - No new symptoms or complications have been noted.  - Symptomatic treatment appears effective.  - Continued monitoring of respiratory symptoms is recommended.    4. Osteoporosis.  - Diagnosed with osteoporosis but has declined treatment.  - No new symptoms or complications have been noted.  - Patient preference for non-treatment has been discussed.  - Continued

## 2025-07-30 LAB
25(OH)D3+25(OH)D2 SERPL-MCNC: 23.1 NG/ML (ref 30–100)
ALBUMIN SERPL-MCNC: 4.2 G/DL (ref 3.6–4.6)
ALP SERPL-CCNC: 68 IU/L (ref 44–121)
ALT SERPL-CCNC: 14 IU/L (ref 0–32)
AST SERPL-CCNC: 24 IU/L (ref 0–40)
BILIRUB SERPL-MCNC: 0.3 MG/DL (ref 0–1.2)
BUN SERPL-MCNC: 14 MG/DL (ref 10–36)
BUN/CREAT SERPL: 23 (ref 12–28)
CALCIUM SERPL-MCNC: 9.4 MG/DL (ref 8.7–10.3)
CHLORIDE SERPL-SCNC: 104 MMOL/L (ref 96–106)
CHOLEST SERPL-MCNC: 206 MG/DL (ref 100–199)
CO2 SERPL-SCNC: 24 MMOL/L (ref 20–29)
CREAT SERPL-MCNC: 0.61 MG/DL (ref 0.57–1)
EGFRCR SERPLBLD CKD-EPI 2021: 84 ML/MIN/1.73
ERYTHROCYTE [DISTWIDTH] IN BLOOD BY AUTOMATED COUNT: 14.8 % (ref 11.7–15.4)
EST. AVERAGE GLUCOSE BLD GHB EST-MCNC: 117 MG/DL
GLOBULIN SER CALC-MCNC: 2.7 G/DL (ref 1.5–4.5)
GLUCOSE SERPL-MCNC: 80 MG/DL (ref 70–99)
HBA1C MFR BLD: 5.7 % (ref 4.8–5.6)
HCT VFR BLD AUTO: 39.5 % (ref 34–46.6)
HDLC SERPL-MCNC: 65 MG/DL
HGB BLD-MCNC: 11.7 G/DL (ref 11.1–15.9)
LDLC SERPL CALC-MCNC: 107 MG/DL (ref 0–99)
MCH RBC QN AUTO: 23 PG (ref 26.6–33)
MCHC RBC AUTO-ENTMCNC: 29.6 G/DL (ref 31.5–35.7)
MCV RBC AUTO: 78 FL (ref 79–97)
PLATELET # BLD AUTO: 262 X10E3/UL (ref 150–450)
POTASSIUM SERPL-SCNC: 4.4 MMOL/L (ref 3.5–5.2)
PROT SERPL-MCNC: 6.9 G/DL (ref 6–8.5)
RBC # BLD AUTO: 5.08 X10E6/UL (ref 3.77–5.28)
SODIUM SERPL-SCNC: 143 MMOL/L (ref 134–144)
TRIGL SERPL-MCNC: 198 MG/DL (ref 0–149)
VLDLC SERPL CALC-MCNC: 34 MG/DL (ref 5–40)
WBC # BLD AUTO: 6.9 X10E3/UL (ref 3.4–10.8)

## 2025-07-31 RX ORDER — ERGOCALCIFEROL 1.25 MG/1
50000 CAPSULE, LIQUID FILLED ORAL WEEKLY
Qty: 12 CAPSULE | Refills: 1 | Status: SHIPPED | OUTPATIENT
Start: 2025-07-31